# Patient Record
Sex: FEMALE | Race: WHITE | HISPANIC OR LATINO | Employment: FULL TIME | URBAN - METROPOLITAN AREA
[De-identification: names, ages, dates, MRNs, and addresses within clinical notes are randomized per-mention and may not be internally consistent; named-entity substitution may affect disease eponyms.]

---

## 2021-07-31 ENCOUNTER — OFFICE VISIT (OUTPATIENT)
Dept: URGENT CARE | Facility: PHYSICIAN GROUP | Age: 41
End: 2021-07-31
Payer: COMMERCIAL

## 2021-07-31 ENCOUNTER — HOSPITAL ENCOUNTER (OUTPATIENT)
Facility: MEDICAL CENTER | Age: 41
End: 2021-07-31
Attending: STUDENT IN AN ORGANIZED HEALTH CARE EDUCATION/TRAINING PROGRAM
Payer: COMMERCIAL

## 2021-07-31 VITALS
HEART RATE: 76 BPM | OXYGEN SATURATION: 95 % | DIASTOLIC BLOOD PRESSURE: 76 MMHG | BODY MASS INDEX: 35.44 KG/M2 | SYSTOLIC BLOOD PRESSURE: 122 MMHG | HEIGHT: 63 IN | RESPIRATION RATE: 16 BRPM | TEMPERATURE: 97.5 F | WEIGHT: 200 LBS

## 2021-07-31 DIAGNOSIS — N76.0 VAGINOSIS: ICD-10-CM

## 2021-07-31 DIAGNOSIS — R73.03 PREDIABETES: ICD-10-CM

## 2021-07-31 PROCEDURE — 99203 OFFICE O/P NEW LOW 30 MIN: CPT | Performed by: STUDENT IN AN ORGANIZED HEALTH CARE EDUCATION/TRAINING PROGRAM

## 2021-07-31 PROCEDURE — 87480 CANDIDA DNA DIR PROBE: CPT

## 2021-07-31 PROCEDURE — 87510 GARDNER VAG DNA DIR PROBE: CPT

## 2021-07-31 PROCEDURE — 87660 TRICHOMONAS VAGIN DIR PROBE: CPT

## 2021-07-31 RX ORDER — FLUCONAZOLE 150 MG/1
TABLET ORAL
Qty: 3 TABLET | Refills: 0 | Status: SHIPPED | OUTPATIENT
Start: 2021-07-31 | End: 2021-08-12

## 2021-07-31 NOTE — PROGRESS NOTES
"Subjective:   CHIEF COMPLAINT  Chief Complaint   Patient presents with   • Vaginitis     yeast infection, rash on vagina. burns when wiping, skin irritation. white spots all over vagina       HPI  Sherry Hatfield is a 41 y.o. female who presents with a chief complaint of concerns for possible yeast infection.  Says she gets these frequently.  She reports her symptoms as vaginal pruritus associated with white vaginal discharge along the periphery of the vulva.  There has been no odor.  She has tried topical over-the-counter antifungals which have not helped.  She recently moved to the area and when she was previously established with [facility] said she would typically be treated with 2 tablets of fluconazole on day 1 followed by a third tab on day 2-3.  He is not experiencing any dysuria or hematuria.  No odor.    REVIEW OF SYSTEMS  General: no fever or chills  GI: no nausea or vomiting  See HPI for further details.    PAST MEDICAL HISTORY  There are no problems to display for this patient.      SURGICAL HISTORY  patient denies any surgical history    ALLERGIES  Allergies   Allergen Reactions   • Morphine      itchy       CURRENT MEDICATIONS  Home Medications     Reviewed by Kelley Love on 07/31/21 at 1507  Med List Status: <None>   Medication Last Dose Status        Patient [place] Taking any Medications                       SOCIAL HISTORY  Social History     Tobacco Use   • Smoking status: Never Smoker   • Smokeless tobacco: Never Used   Vaping Use   • Vaping Use: Never used   Substance and Sexual Activity   • Alcohol use: Never   • Drug use: Never   • Sexual activity: Not on file       FAMILY HISTORY  No family history on file.       Objective:   PHYSICAL EXAM  VITAL SIGNS: /76 (BP Location: Right arm, Patient Position: Sitting, BP Cuff Size: Adult)   Pulse 76   Temp 36.4 °C (97.5 °F) (Temporal)   Resp 16   Ht 1.6 m (5' 3\")   Wt 90.7 kg (200 lb)   SpO2 95%   BMI 35.43 kg/m²     Gen: no " acute distress, normal voice  Skin: dry, intact, moist mucosal membranes  Head: Atraumatic, normocephalic  Psych: normal affect, normal judgement, alert, awake    Assessment/Plan:     1. Vaginosis  VAGINAL PATHOGENS DNA PANEL    fluconazole (DIFLUCAN) 150 MG tablet    AMB REFERRAL BACK TO [facility]   2. Prediabetes     1) signs and symptoms are consistent with vaginal candidiasis.  This is somewhat of a chronic intermittent issue for the patient and says her current symptoms are consistent with previous infections.   -Ordered Diflucan  -Ordered vaginal pathogens.  Only contact the patient if need to update/change medications at [phone]    2) ordered referral to establish with primary care as the patient recently moved to the area and was previously established with [facility].    Differential diagnosis, natural history, supportive care, and indications for immediate follow-up discussed. All questions answered. Patient agrees with the plan of care.    Follow-up as needed if symptoms worsen or fail to improve to PCP, Urgent care or Emergency Room.    Please note that this dictation was created using voice recognition software. I have made a reasonable attempt to correct obvious errors, but I expect that there are errors of grammar and possibly content that I did not discover before finalizing the note.

## 2021-08-01 DIAGNOSIS — N76.0 VAGINOSIS: ICD-10-CM

## 2021-08-01 LAB
CANDIDA DNA VAG QL PROBE+SIG AMP: NEGATIVE
G VAGINALIS DNA VAG QL PROBE+SIG AMP: NEGATIVE
T VAGINALIS DNA VAG QL PROBE+SIG AMP: NEGATIVE

## 2021-08-02 ENCOUNTER — TELEPHONE (OUTPATIENT)
Dept: SCHEDULING | Facility: IMAGING CENTER | Age: 41
End: 2021-08-02

## 2021-08-02 ENCOUNTER — TELEPHONE (OUTPATIENT)
Dept: URGENT CARE | Facility: CLINIC | Age: 41
End: 2021-08-02

## 2021-08-02 DIAGNOSIS — N76.0 VAGINOSIS: ICD-10-CM

## 2021-08-02 NOTE — TELEPHONE ENCOUNTER
I contacted the patient and spoke with her over the phone informing her of the negative vaginal pathogen test.  She says the itching has resolved but does have a rash along her inner thighs.  The rash was initially pruritic as well but that has resolved after taking Diflucan.  Its possible she has an underlying tinea cruris infection.  I instructed her to try a topical over-the-counter antifungal, but if symptoms do not improve or worsen she needs to come back in for reevaluation and visualization of the area.  The patient verbalized her understanding.  All questions were answered.

## 2021-08-07 ENCOUNTER — HOSPITAL ENCOUNTER (OUTPATIENT)
Facility: MEDICAL CENTER | Age: 41
End: 2021-08-07
Attending: PHYSICIAN ASSISTANT
Payer: COMMERCIAL

## 2021-08-07 ENCOUNTER — OFFICE VISIT (OUTPATIENT)
Dept: URGENT CARE | Facility: PHYSICIAN GROUP | Age: 41
End: 2021-08-07
Payer: COMMERCIAL

## 2021-08-07 VITALS
SYSTOLIC BLOOD PRESSURE: 126 MMHG | TEMPERATURE: 97.4 F | RESPIRATION RATE: 14 BRPM | HEART RATE: 74 BPM | BODY MASS INDEX: 35.22 KG/M2 | DIASTOLIC BLOOD PRESSURE: 74 MMHG | OXYGEN SATURATION: 97 % | WEIGHT: 198.8 LBS

## 2021-08-07 DIAGNOSIS — Z20.822 CLOSE EXPOSURE TO COVID-19 VIRUS: ICD-10-CM

## 2021-08-07 DIAGNOSIS — R05.9 COUGH: ICD-10-CM

## 2021-08-07 DIAGNOSIS — R11.0 NAUSEA: ICD-10-CM

## 2021-08-07 LAB — COVID ORDER STATUS COVID19: NORMAL

## 2021-08-07 PROCEDURE — U0005 INFEC AGEN DETEC AMPLI PROBE: HCPCS

## 2021-08-07 PROCEDURE — 99214 OFFICE O/P EST MOD 30 MIN: CPT | Mod: CS | Performed by: PHYSICIAN ASSISTANT

## 2021-08-07 PROCEDURE — U0003 INFECTIOUS AGENT DETECTION BY NUCLEIC ACID (DNA OR RNA); SEVERE ACUTE RESPIRATORY SYNDROME CORONAVIRUS 2 (SARS-COV-2) (CORONAVIRUS DISEASE [COVID-19]), AMPLIFIED PROBE TECHNIQUE, MAKING USE OF HIGH THROUGHPUT TECHNOLOGIES AS DESCRIBED BY CMS-2020-01-R: HCPCS

## 2021-08-07 RX ORDER — ONDANSETRON 4 MG/1
4 TABLET, FILM COATED ORAL EVERY 6 HOURS PRN
Qty: 20 TABLET | Refills: 1 | Status: SHIPPED | OUTPATIENT
Start: 2021-08-07 | End: 2021-10-04 | Stop reason: SDUPTHER

## 2021-08-07 RX ORDER — DEXTROMETHORPHAN HYDROBROMIDE AND PROMETHAZINE HYDROCHLORIDE 15; 6.25 MG/5ML; MG/5ML
5 SYRUP ORAL EVERY 4 HOURS PRN
Qty: 120 ML | Refills: 0 | Status: SHIPPED | OUTPATIENT
Start: 2021-08-07 | End: 2023-08-01

## 2021-08-07 ASSESSMENT — ENCOUNTER SYMPTOMS
SORE THROAT: 0
CHILLS: 1
SHORTNESS OF BREATH: 0
COUGH: 1
VOMITING: 0
ABDOMINAL PAIN: 0
WHEEZING: 0
DIARRHEA: 0
SPUTUM PRODUCTION: 0
NAUSEA: 1
FEVER: 0

## 2021-08-07 NOTE — PROGRESS NOTES
Subjective:   Sherry Hatfield  is a 41 y.o. female who presents for Body Aches (Head ache and fatigue satrted last night. (Exposed to Covid recently.))      HPI  Patient presents to urgent care out of concern for exposure to COVID-19.  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  Was in proximity of new hire employees last Wednesday until later that day patient had tested positive for COVID-19.  Patient complains of body aches, headache, cough.  Denies sore throat or ear pain.  Notes mild nausea without vomiting.  Denies abdominal pain diarrhea or rash.  Denies history of pneumonia.  Notes remote history of bronchitis in childhood.  Does have asthma, denies recent wheezing or worsening breathing.  Denies loss of taste or smell.  Patient has been vaccinated against COVID-19.  Did have Pfizer vaccine in May 2021.      Review of Systems   Constitutional: Positive for chills and malaise/fatigue. Negative for fever.   HENT: Negative for ear pain and sore throat.    Respiratory: Positive for cough. Negative for sputum production, shortness of breath and wheezing.    Gastrointestinal: Positive for nausea. Negative for abdominal pain, diarrhea and vomiting.   Skin: Negative for rash.       Allergies   Allergen Reactions   • Morphine      itchy        Objective:   /74   Pulse 74   Temp 36.3 °C (97.4 °F)   Resp 14   Wt 90.2 kg (198 lb 12.8 oz)   SpO2 97%   BMI 35.22 kg/m²     Physical Exam  Vitals and nursing note reviewed.   Constitutional:       General: She is not in acute distress.     Appearance: She is well-developed. She is not diaphoretic.   HENT:      Head: Normocephalic and atraumatic.      Right Ear: Tympanic membrane, ear canal and external ear normal.      Left Ear: Tympanic membrane, ear canal and external ear normal.      Nose: Nose normal.      Mouth/Throat:      Pharynx: Uvula midline. Posterior oropharyngeal erythema (slightly deeper) present. No oropharyngeal  exudate.      Tonsils: No tonsillar abscesses.   Eyes:      General: Lids are normal. No scleral icterus.        Right eye: No discharge.         Left eye: No discharge.      Conjunctiva/sclera: Conjunctivae normal.   Pulmonary:      Effort: Pulmonary effort is normal. No respiratory distress.      Breath sounds: No decreased breath sounds, wheezing, rhonchi or rales.   Musculoskeletal:         General: Normal range of motion.      Cervical back: Neck supple.   Lymphadenopathy:      Cervical: No cervical adenopathy.   Skin:     General: Skin is warm and dry.      Coloration: Skin is not pale.      Findings: No erythema.   Neurological:      Mental Status: She is alert and oriented to person, place, and time. She is not disoriented.   Psychiatric:         Speech: Speech normal.         Behavior: Behavior normal.     COVID pending    Assessment/Plan:   1. Close exposure to COVID-19 virus  - COVID/SARS CoV-2 PCR; Future    2. Nausea  - COVID/SARS CoV-2 PCR; Future  - ondansetron (ZOFRAN) 4 MG Tab tablet; Take 1 tablet by mouth every 6 hours as needed for Nausea/Vomiting.  Dispense: 20 tablet; Refill: 1    3. Cough  - COVID/SARS CoV-2 PCR; Future  - promethazine-dextromethorphan (PROMETHAZINE-DM) 6.25-15 MG/5ML syrup; Take 5 mL by mouth every four hours as needed for Cough.  Dispense: 120 mL; Refill: 0  Supportive care is reviewed with patient/caregiver - recommend to push PO fluids and electrolytes, over-the-counter symptom support medications reviewed, ER precautions with worsened symptoms, quarantine recommendations reviewed, sent with letterFritz for results of testing  Cautioned regarding potential for sedation with medication.  Return to clinic with lack of resolution or progression of symptoms.  ER precautions with any worsening symptoms are reviewed with patient/caregiver and they do express understanding    I have worn an N95 mask, gloves and eye protection for the entire encounter with this patient.      Differential diagnosis, natural history, supportive care, and indications for immediate follow-up discussed.

## 2021-08-07 NOTE — LETTER
August 7, 2021       Patient: Sherry Hatfield   YOB: 1980   Date of Visit: 8/7/2021           To Whom it May Concern,   Your employee/student was seen in our clinic today. A concern for COVID-19 has been identified and testing is in progress.?   ?  We are asking you to excuse absences while following self-isolation protocol per Center for Disease Control (CDC) guidelines. Your employee/student will be able to access test results through our electronic delivery system called [employer].?   ?  If the results of testing are negative, and once there has been no fever (temperature >100.4 F) for at least 72 hours without treatment, and no vomiting or diarrhea for at least 48 hours, then return to work/school is approved.   ?  If the results of testing are positive then your employee/student will be contacted by the [facility] or [facility] department for further instructions on duration of self-isolation and return to work/school protocol. In general, this will also follow the CDC guidelines with a minimum of 10 days from the onset of symptoms and without fever, vomiting, or diarrhea as above.?   ?  In general, repeat testing is not necessary and not offered through our [facility].?   ?  This is the only note that will be provided from [facility] for this visit. Your employee/student will require an appointment with a primary care provider if FMLA or disability forms are required.   ?  Sincerely,?           Ashok Boyle P.A.-C.  Electronically Signed

## 2021-08-09 LAB
SARS-COV-2 RNA RESP QL NAA+PROBE: DETECTED
SPECIMEN SOURCE: ABNORMAL

## 2021-08-11 ENCOUNTER — TELEPHONE (OUTPATIENT)
Dept: SCHEDULING | Facility: IMAGING CENTER | Age: 41
End: 2021-08-11

## 2021-08-12 ENCOUNTER — TELEMEDICINE (OUTPATIENT)
Dept: MEDICAL GROUP | Facility: PHYSICIAN GROUP | Age: 41
End: 2021-08-12
Payer: COMMERCIAL

## 2021-08-12 VITALS — WEIGHT: 198 LBS | BODY MASS INDEX: 35.08 KG/M2 | OXYGEN SATURATION: 97 % | HEIGHT: 63 IN

## 2021-08-12 DIAGNOSIS — G43.019 INTRACTABLE MIGRAINE WITHOUT AURA AND WITHOUT STATUS MIGRAINOSUS: ICD-10-CM

## 2021-08-12 DIAGNOSIS — U07.1 COVID-19 VIRUS INFECTION: ICD-10-CM

## 2021-08-12 DIAGNOSIS — J30.2 SEASONAL ALLERGIES: ICD-10-CM

## 2021-08-12 DIAGNOSIS — F31.9 BIPOLAR 1 DISORDER (HCC): ICD-10-CM

## 2021-08-12 DIAGNOSIS — J45.40 MODERATE PERSISTENT ASTHMA WITHOUT COMPLICATION: ICD-10-CM

## 2021-08-12 DIAGNOSIS — L98.9 SKIN DISORDER: ICD-10-CM

## 2021-08-12 DIAGNOSIS — K21.9 GASTROESOPHAGEAL REFLUX DISEASE, UNSPECIFIED WHETHER ESOPHAGITIS PRESENT: ICD-10-CM

## 2021-08-12 DIAGNOSIS — E28.2 PCOS (POLYCYSTIC OVARIAN SYNDROME): ICD-10-CM

## 2021-08-12 PROCEDURE — 99214 OFFICE O/P EST MOD 30 MIN: CPT | Performed by: FAMILY MEDICINE

## 2021-08-12 RX ORDER — ALBUTEROL SULFATE 90 UG/1
2 AEROSOL, METERED RESPIRATORY (INHALATION) EVERY 6 HOURS PRN
COMMUNITY

## 2021-08-12 RX ORDER — NORGESTIMATE AND ETHINYL ESTRADIOL 0.25-0.035
1 KIT ORAL DAILY
COMMUNITY
End: 2021-10-04 | Stop reason: SDUPTHER

## 2021-08-12 RX ORDER — FLUNISOLIDE 0.25 MG/ML
2 SOLUTION NASAL 2 TIMES DAILY
Qty: 1 EACH | Refills: 5 | Status: SHIPPED | OUTPATIENT
Start: 2021-08-12

## 2021-08-12 RX ORDER — FLUTICASONE PROPIONATE AND SALMETEROL XINAFOATE 230; 21 UG/1; UG/1
1 AEROSOL, METERED RESPIRATORY (INHALATION) 2 TIMES DAILY
COMMUNITY
End: 2021-08-12 | Stop reason: SDUPTHER

## 2021-08-12 RX ORDER — NARATRIPTAN 2.5 MG/1
2.5 TABLET ORAL
COMMUNITY
End: 2021-10-04 | Stop reason: SDUPTHER

## 2021-08-12 RX ORDER — DEXLANSOPRAZOLE 30 MG/1
60 CAPSULE, DELAYED RELEASE ORAL DAILY
COMMUNITY
End: 2021-08-12 | Stop reason: SDUPTHER

## 2021-08-12 RX ORDER — IBUPROFEN 800 MG/1
800 TABLET ORAL
COMMUNITY
End: 2021-10-04 | Stop reason: SDUPTHER

## 2021-08-12 RX ORDER — DEXLANSOPRAZOLE 30 MG/1
60 CAPSULE, DELAYED RELEASE ORAL DAILY
Qty: 60 CAPSULE | Refills: 5 | Status: SHIPPED | OUTPATIENT
Start: 2021-08-12 | End: 2021-10-01 | Stop reason: SDUPTHER

## 2021-08-12 RX ORDER — FLUTICASONE PROPIONATE AND SALMETEROL XINAFOATE 230; 21 UG/1; UG/1
1 AEROSOL, METERED RESPIRATORY (INHALATION) 2 TIMES DAILY
Qty: 12 G | Refills: 5 | Status: SHIPPED | OUTPATIENT
Start: 2021-08-12 | End: 2022-06-30

## 2021-08-12 RX ORDER — TRIAMCINOLONE ACETONIDE 1 MG/G
CREAM TOPICAL 2 TIMES DAILY
COMMUNITY

## 2021-08-12 RX ORDER — TIOTROPIUM BROMIDE INHALATION SPRAY 3.12 UG/1
5 SPRAY, METERED RESPIRATORY (INHALATION) DAILY
COMMUNITY
End: 2022-06-30

## 2021-08-12 RX ORDER — QUETIAPINE FUMARATE 25 MG/1
100 TABLET, FILM COATED ORAL
COMMUNITY
End: 2021-10-04 | Stop reason: SDUPTHER

## 2021-08-12 RX ORDER — TIOTROPIUM BROMIDE 18 UG/1
18 CAPSULE ORAL; RESPIRATORY (INHALATION) DAILY
COMMUNITY
End: 2021-08-12 | Stop reason: SDUPTHER

## 2021-08-12 RX ORDER — FLUNISOLIDE 0.25 MG/ML
2 SOLUTION NASAL 2 TIMES DAILY
COMMUNITY
End: 2021-08-12 | Stop reason: SDUPTHER

## 2021-08-12 RX ORDER — TIZANIDINE HYDROCHLORIDE 2 MG/1
2 CAPSULE, GELATIN COATED ORAL 3 TIMES DAILY PRN
COMMUNITY
End: 2023-08-01 | Stop reason: SDUPTHER

## 2021-08-12 RX ORDER — TRETINOIN 0.5 MG/G
CREAM TOPICAL NIGHTLY
COMMUNITY
End: 2021-10-04 | Stop reason: SDUPTHER

## 2021-08-12 RX ORDER — OXCARBAZEPINE 150 MG/1
600 TABLET, FILM COATED ORAL
COMMUNITY
End: 2021-10-04 | Stop reason: SDUPTHER

## 2021-08-12 RX ORDER — TIOTROPIUM BROMIDE 18 UG/1
18 CAPSULE ORAL; RESPIRATORY (INHALATION) DAILY
Qty: 30 CAPSULE | Refills: 5 | Status: SHIPPED | OUTPATIENT
Start: 2021-08-12 | End: 2022-06-30

## 2021-08-12 RX ORDER — SPIRONOLACTONE 25 MG/1
25 TABLET ORAL 2 TIMES DAILY
COMMUNITY
End: 2021-10-04 | Stop reason: SDUPTHER

## 2021-08-12 RX ORDER — PROPRANOLOL HYDROCHLORIDE 20 MG/1
40 TABLET ORAL 2 TIMES DAILY
COMMUNITY
End: 2021-10-04 | Stop reason: SDUPTHER

## 2021-08-12 ASSESSMENT — PATIENT HEALTH QUESTIONNAIRE - PHQ9: CLINICAL INTERPRETATION OF PHQ2 SCORE: 0

## 2021-08-12 NOTE — LETTER
[facility]  Kelly Roth M.D.  [address]  [place] [address]  Fax: [phone]   Authorization for Release/Disclosure of   Protected Health Information   Name: JEANETH HATFIELD : 1980 SSN: [id number]   Address: [address] [place] [address]  [place] [address] Phone:    [phone] (home)    I authorize the entity listed below to release/disclose the PHI below to:   [facility]/Kelly Roth M.D. and Kelly Roth M.D.   Provider or Entity Name:  [facility]   City, [facility] Phone:      Fax:     Reason for request: continuity of care   Information to be released:    [  ] LAST COLONOSCOPY,  including any PATH REPORT and follow-up  [  ] LAST FIT/COLOGUARD RESULT [  ] LAST DEXA  [  ] LAST MAMMOGRAM  [  ] LAST PAP  [  ] LAST LABS [  ] RETINA EXAM REPORT  [  ] IMMUNIZATION RECORDS  [  ] Release all info      [  ] Check here and initial the line next to each item to release ALL health information INCLUDING  _____ Care and treatment for drug and / or alcohol abuse  _____ HIV testing, infection status, or AIDS  _____ Genetic Testing    DATES OF SERVICE OR TIME PERIOD TO BE DISCLOSED: _____________  I understand and acknowledge that:  * This Authorization may be revoked at any time by you in writing, except if your health information has already been used or disclosed.  * Your health information that will be used or disclosed as a result of you signing this authorization could be re-disclosed by the recipient. If this occurs, your re-disclosed health information may no longer be protected by State or Federal laws.  * You may refuse to sign this Authorization. Your refusal will not affect your ability to obtain treatment.  * This Authorization becomes effective upon signing and will  on (date) __________.      If no date is indicated, this Authorization will  one (1) year from the signature date.    Name: Jeaneth Hatfield    Signature:   Date:     2021       PLEASE  FAX REQUESTED RECORDS BACK TO: [phone]

## 2021-08-15 NOTE — PROGRESS NOTES
Virtual Visit: New Patient   This visit was conducted via Zoom using secure and encrypted videoconferencing technology.   The patient was in a private location in the state [facility].    The patient's identity was confirmed and verbal consent was obtained for this virtual visit.    Subjective:     CC:   Chief Complaint   Patient presents with   • New Patient     est care       Sherry Hatfield is a 41 y.o. female presenting to establish care and to discuss the evaluation and management of:      This is a 41-year-old  female who is here as a new patient to get established with me.  [place].  She was previously under the care of [facility].    She came down with symptoms of COVID-19 infection a week ago.  She was exposed to a coworker who tested positive for COVID-19 infection.  Her symptoms are body aches, headache, cough, sore throat, hoarseness of voice, mild nausea without vomiting.  Denies any shortness of breath.  She went to urgent care 5 days ago and was tested positive for COVID-19 infection.  Patient with history of asthma but denies any wheezing or shortness of breath.  She already completed COVID-19 vaccination in May 2021.    She has multiple medical problems.    She suffers from PCOS and was under the care of a gynecologist through [facility].  She is on spironolactone and OCP.  She would like to be referred to a local gynecologist.    She also has migraine headache without aura.  She is on Inderal for migraine prophylaxis.  She takes Amerge as needed for breakthrough headache.  Headache episodes are still happening frequently about twice a week.  She takes Zofran as needed for nausea associated with migraine headache.  She wants to be referred to the neurologist for further evaluation and treatment to get her migraine headaches better control.  She was previously under the care of a neurologist through [facility].    She also has asthma for which she is on Advair and Spiriva.  She has rescue inhaler which is albuterol HFA.  She said she only uses the albuterol HFA as needed during exacerbations triggered by mostly by environmental allergens.  Otherwise her asthma is well controlled.  She asked to be referred to an allergist for follow-up of her asthma and allergies.     She has bipolar 1 disorder and she takes Seroquel, Trileptal.  She would also like to be referred to a local psychiatrist to follow-up on her problem.  She is currently under control on these medications.    She suffers from GERD and she said the only medication that works well and controls her GERD symptoms is Dexilant which she is currently taking.  She takes 30 mg 2 capsules daily.    She suffers from seasonal allergies for which she takes flunisolide nasal spray with good results.    She said she has a skin condition which affects mainly her scalp.  She is on clobetasol foam to control the problem.  She was under the care of a dermatologist in [facility].  She would like to be referred to the dermatologist.        ROS  See HPI    Current medicines (including changes today)  Current Outpatient Medications   Medication Sig Dispense Refill   • propranolol (INDERAL) 20 MG Tab Take 40 mg by mouth 2 times a day.     • albuterol 108 (90 Base) MCG/ACT Aero Soln inhalation aerosol Inhale 2 Puffs every 6 hours as needed for Shortness of Breath.     • QUEtiapine (SEROQUEL) 25 MG Tab Take 100 mg by mouth at bedtime.     • tretinoin (RETIN-A) 0.05 % cream Apply  topically every evening.     • triamcinolone acetonide (KENALOG) 0.1 % Cream Apply  topically 2 times a day.     • norgestimate-ethinyl estradiol (SPRINTEC 28) 0.25-35 MG-MCG per tablet Take 1 Tablet by mouth every day.     • Clobetasol Emul Foam w/MoistCr 0.05 % Kit Apply  topically.     • naratriptan (AMERGE) 2.5 MG tablet Take 2.5 mg by mouth one time as needed for Migraine.     •  "tizanidine (ZANAFLEX) 2 MG capsule Take 2 mg by mouth 3 times a day as needed.     • ibuprofen (MOTRIN) 800 MG Tab Take 800 mg by mouth at bedtime.     • tiotropium (SPIRIVA RESPIMAT) 2.5 mcg/Act Aero Soln Inhale 5 mcg every day.     • OXcarbazepine (TRILEPTAL) 150 MG Tab Take 600 mg by mouth at bedtime.     • spironolactone (ALDACTONE) 25 MG Tab Take 25 mg by mouth 2 times a day.     • dexlansoprazole (DEXILANT) 30 MG CAPSULE DELAYED RELEASE Take 2 Capsules by mouth every day. 60 Capsule 5   • flunisolide (NASALIDE) 25 MCG/ACT (0.025%) Solution Administer 2 Sprays into affected nostril(S) 2 times a day. 1 Each 5   • fluticasone-salmeterol (ADVAIR HFA) 230-21 MCG/ACT inhaler Inhale 1 Puff 2 times a day. 12 g 5   • tiotropium (SPIRIVA) 18 MCG Cap Place 1 Capsule into inhaler and inhale every day. 30 Capsule 5   • ondansetron (ZOFRAN) 4 MG Tab tablet Take 1 tablet by mouth every 6 hours as needed for Nausea/Vomiting. 20 tablet 1   • promethazine-dextromethorphan (PROMETHAZINE-DM) 6.25-15 MG/5ML syrup Take 5 mL by mouth every four hours as needed for Cough. 120 mL 0     No current facility-administered medications for this visit.       Patient Active Problem List    Diagnosis Date Noted   • PCOS (polycystic ovarian syndrome)    • Migraine headache without aura    • Asthma    • Bipolar 1 disorder (HCC)    • GERD (gastroesophageal reflux disease)         Objective:   Ht 1.6 m (5' 3\")   Wt 89.8 kg (198 lb)   SpO2 97%   BMI 35.07 kg/m²     Physical Exam:  Constitutional: Alert, no distress, well-groomed, positive hoarse voice   skin: No rashes in visible areas.  Eye: Round. Conjunctiva clear, lids normal. No icterus.   ENMT: Lips pink without lesions, good dentition, moist mucous membranes.  Positive hoarse voice  Neck: No masses, no thyromegaly. Moves freely without pain.  Respiratory: Unlabored respiratory effort, no cough or audible wheeze  Psych: Alert and oriented x3, normal affect and mood.   Assessment and Plan: "   The following treatment plan was discussed:       1. COVID-19 virus infection  Symptoms are mild.  Advised to manage this conservatively at home with proper hydration, rest, Tylenol or NSAIDs as needed for fever and body aches.  ER precautions given.    2. PCOS (polycystic ovarian syndrome)  Continue spironolactone told and OCP.  We will refer to local GYN.  Will get records from her previous GYN and primary care physician.  She will provide me with names of her doctors.    3. Intractable migraine without aura and without status migrainosus  We will refer to our neurology group for further evaluation and treatment and to get the migraine better controlled.  In the meantime continue current medications.    4. Moderate persistent asthma without complication  Stable and controlled on her regimen of Spiriva, Advair and albuterol inhaler as a rescue inhaler.    5. Bipolar 1 disorder (HCC)  Currently stable and controlled on her medications.  Referral placed to psychiatrist for ongoing follow-up.    6. Gastroesophageal reflux disease, unspecified whether esophagitis present  Stable and controlled on her current medication Dexilant.  She said this is the only medication that is working to control her symptoms.    7. Seasonal allergies  She takes flunisolide nasal spray to manage her problem with good results.    8. Skin disorder  She is asking for referral to the dermatologist for her skin disorder involving the scalp.  She is on clobetasol foam.  Referral placed.    Follow-up: Return in about 2 months (around 10/12/2021).     We will get records from previous physicians.  She will provide me with the names of them.

## 2021-10-04 ENCOUNTER — DOCUMENTATION (OUTPATIENT)
Dept: MEDICAL GROUP | Facility: PHYSICIAN GROUP | Age: 41
End: 2021-10-04

## 2021-10-04 DIAGNOSIS — R11.0 NAUSEA: ICD-10-CM

## 2021-10-04 DIAGNOSIS — Z30.41 ENCOUNTER FOR SURVEILLANCE OF CONTRACEPTIVE PILLS: ICD-10-CM

## 2021-10-04 DIAGNOSIS — L98.9 SKIN DISORDER: ICD-10-CM

## 2021-10-04 DIAGNOSIS — G43.019 INTRACTABLE MIGRAINE WITHOUT AURA AND WITHOUT STATUS MIGRAINOSUS: ICD-10-CM

## 2021-10-04 DIAGNOSIS — E28.2 PCOS (POLYCYSTIC OVARIAN SYNDROME): ICD-10-CM

## 2021-10-04 DIAGNOSIS — F31.9 BIPOLAR 1 DISORDER (HCC): ICD-10-CM

## 2021-10-04 RX ORDER — PROPRANOLOL HYDROCHLORIDE 20 MG/1
40 TABLET ORAL 2 TIMES DAILY
Qty: 120 TABLET | Refills: 5 | Status: SHIPPED | OUTPATIENT
Start: 2021-10-04 | End: 2022-06-14 | Stop reason: SDUPTHER

## 2021-10-04 RX ORDER — OXCARBAZEPINE 150 MG/1
600 TABLET, FILM COATED ORAL
Qty: 120 TABLET | Refills: 0 | Status: SHIPPED | OUTPATIENT
Start: 2021-10-04 | End: 2021-11-29

## 2021-10-04 RX ORDER — NARATRIPTAN 2.5 MG/1
2.5 TABLET ORAL
Qty: 10 TABLET | Refills: 5 | Status: SHIPPED | OUTPATIENT
Start: 2021-10-04 | End: 2022-07-12 | Stop reason: SDUPTHER

## 2021-10-04 RX ORDER — TRETINOIN 0.5 MG/G
CREAM TOPICAL
Qty: 45 G | Refills: 5 | Status: SHIPPED | OUTPATIENT
Start: 2021-10-04 | End: 2023-03-30 | Stop reason: SDUPTHER

## 2021-10-04 RX ORDER — NORGESTIMATE AND ETHINYL ESTRADIOL 0.25-0.035
1 KIT ORAL DAILY
Qty: 28 TABLET | Refills: 5 | Status: SHIPPED | OUTPATIENT
Start: 2021-10-04 | End: 2021-11-18

## 2021-10-04 RX ORDER — QUETIAPINE FUMARATE 25 MG/1
100 TABLET, FILM COATED ORAL
Qty: 120 TABLET | Refills: 0 | Status: SHIPPED | OUTPATIENT
Start: 2021-10-04 | End: 2021-11-29

## 2021-10-04 RX ORDER — IBUPROFEN 800 MG/1
800 TABLET ORAL EVERY 8 HOURS PRN
Qty: 90 TABLET | Refills: 1 | Status: SHIPPED | OUTPATIENT
Start: 2021-10-04 | End: 2023-08-01

## 2021-10-04 RX ORDER — SPIRONOLACTONE 25 MG/1
25 TABLET ORAL 2 TIMES DAILY
Qty: 60 TABLET | Refills: 5 | Status: SHIPPED | OUTPATIENT
Start: 2021-10-04 | End: 2022-06-14 | Stop reason: SDUPTHER

## 2021-10-04 RX ORDER — CLOBETASOL PROPIONATE 0.5 MG/G
AEROSOL, FOAM TOPICAL
Qty: 100 G | Refills: 5 | Status: SHIPPED | OUTPATIENT
Start: 2021-10-04 | End: 2022-05-20 | Stop reason: SDUPTHER

## 2021-10-04 RX ORDER — ONDANSETRON 4 MG/1
4 TABLET, FILM COATED ORAL EVERY 6 HOURS PRN
Qty: 20 TABLET | Refills: 1 | Status: SHIPPED | OUTPATIENT
Start: 2021-10-04 | End: 2021-10-11

## 2021-10-04 NOTE — PROGRESS NOTES
MEDICATION PRIOR AUTHORIZATION NEEDED:    1. Name of Medication: Clobetasol Foam, & Dexilant 30mg    2. Requested By (Name of Pharmacy): [facility]      3. Is insurance on file current? Yes    4. What is the name & phone number of the 3rd party payor? [employer] [phone]

## 2021-11-01 ENCOUNTER — OFFICE VISIT (OUTPATIENT)
Dept: URGENT CARE | Facility: PHYSICIAN GROUP | Age: 41
End: 2021-11-01
Payer: COMMERCIAL

## 2021-11-01 VITALS
OXYGEN SATURATION: 97 % | SYSTOLIC BLOOD PRESSURE: 118 MMHG | TEMPERATURE: 98.2 F | HEART RATE: 84 BPM | HEIGHT: 63 IN | RESPIRATION RATE: 16 BRPM | BODY MASS INDEX: 36.14 KG/M2 | WEIGHT: 204 LBS | DIASTOLIC BLOOD PRESSURE: 80 MMHG

## 2021-11-01 DIAGNOSIS — M25.572 ACUTE LEFT ANKLE PAIN: ICD-10-CM

## 2021-11-01 DIAGNOSIS — S96.912A STRAIN OF LEFT ANKLE, INITIAL ENCOUNTER: ICD-10-CM

## 2021-11-01 PROCEDURE — 99213 OFFICE O/P EST LOW 20 MIN: CPT | Performed by: PHYSICIAN ASSISTANT

## 2021-11-01 RX ORDER — KETOROLAC TROMETHAMINE 30 MG/ML
30 INJECTION, SOLUTION INTRAMUSCULAR; INTRAVENOUS ONCE
Status: COMPLETED | OUTPATIENT
Start: 2021-11-01 | End: 2021-11-01

## 2021-11-01 RX ADMIN — KETOROLAC TROMETHAMINE 30 MG: 30 INJECTION, SOLUTION INTRAMUSCULAR; INTRAVENOUS at 19:45

## 2021-11-02 ASSESSMENT — ENCOUNTER SYMPTOMS
NAUSEA: 0
CHILLS: 0
FEVER: 0
TINGLING: 0
SENSORY CHANGE: 0
PALPITATIONS: 0
SHORTNESS OF BREATH: 0
BLURRED VISION: 0
SORE THROAT: 0
VOMITING: 0

## 2021-11-02 NOTE — PROGRESS NOTES
Subjective     Sherry Hatfield is a 41 y.o. female who presents with Ankle Pain (L ankle pain, painful to put pressure/walk, x1 day )    HPI:  Sherry Hatfield is a 41 y.o. female who presents today for evaluation of left ankle pain.  Patient reports that on Friday she was walking in her house when she started to feel a pain in the medial aspect of her left ankle.  States that she did not twist it or fall.  Denies injuring it in any capacity.  Notes that she has been trying to rest it, apply ice, keep it elevated and she is also been applying some topical Salonpas.  She is also using OTC analgesics and anti-inflammatories.  She notes that despite all of this, however, the pain has been quite persistent and it is worse with ambulation and palpation.  She is able to walk on the outside of her foot.        Review of Systems   Constitutional: Negative for chills and fever.   HENT: Negative for sore throat.    Eyes: Negative for blurred vision.   Respiratory: Negative for shortness of breath.    Cardiovascular: Negative for chest pain and palpitations.   Gastrointestinal: Negative for nausea and vomiting.   Musculoskeletal: Positive for joint pain (left ankle pain).   Neurological: Negative for tingling and sensory change.         PMH:  has a past medical history of Asthma, Bipolar 1 disorder (HCC), GERD (gastroesophageal reflux disease), Migraine headache without aura, and PCOS (polycystic ovarian syndrome).  MEDS:   Current Outpatient Medications:   •  naratriptan (AMERGE) 2.5 MG tablet, Take 1 Tablet by mouth one time as needed for Migraine., Disp: 10 Tablet, Rfl: 5  •  propranolol (INDERAL) 20 MG Tab, Take 2 Tablets by mouth 2 times a day., Disp: 120 Tablet, Rfl: 5  •  QUEtiapine (SEROQUEL) 25 MG Tab, Take 4 Tablets by mouth at bedtime., Disp: 120 Tablet, Rfl: 0  •  tretinoin (RETIN-A) 0.05 % cream, Apply  topically every evening., Disp: 45 g, Rfl: 5  •  norgestimate-ethinyl estradiol (SPRINTEC 28) 0.25-35 MG-MCG per  tablet, Take 1 Tablet by mouth every day., Disp: 28 Tablet, Rfl: 5  •  ibuprofen (MOTRIN) 800 MG Tab, Take 1 Tablet by mouth every 8 hours as needed., Disp: 90 Tablet, Rfl: 1  •  OXcarbazepine (TRILEPTAL) 150 MG Tab, Take 4 Tablets by mouth at bedtime., Disp: 120 Tablet, Rfl: 0  •  spironolactone (ALDACTONE) 25 MG Tab, Take 1 Tablet by mouth 2 times a day., Disp: 60 Tablet, Rfl: 5  •  clobetasol (OLUX) 0.05 % foam, Apply to affected areas on the scalp twice daily, Disp: 100 g, Rfl: 5  •  dexlansoprazole (DEXILANT) 30 MG CAPSULE DELAYED RELEASE, Take 2 Capsules by mouth every day., Disp: 60 Capsule, Rfl: 5  •  albuterol 108 (90 Base) MCG/ACT Aero Soln inhalation aerosol, Inhale 2 Puffs every 6 hours as needed for Shortness of Breath., Disp: , Rfl:   •  triamcinolone acetonide (KENALOG) 0.1 % Cream, Apply  topically 2 times a day., Disp: , Rfl:   •  Clobetasol Emul Foam w/MoistCr 0.05 % Kit, Apply  topically., Disp: , Rfl:   •  tizanidine (ZANAFLEX) 2 MG capsule, Take 2 mg by mouth 3 times a day as needed., Disp: , Rfl:   •  tiotropium (SPIRIVA RESPIMAT) 2.5 mcg/Act Aero Soln, Inhale 5 mcg every day., Disp: , Rfl:   •  flunisolide (NASALIDE) 25 MCG/ACT (0.025%) Solution, Administer 2 Sprays into affected nostril(S) 2 times a day., Disp: 1 Each, Rfl: 5  •  fluticasone-salmeterol (ADVAIR HFA) 230-21 MCG/ACT inhaler, Inhale 1 Puff 2 times a day., Disp: 12 g, Rfl: 5  •  tiotropium (SPIRIVA) 18 MCG Cap, Place 1 Capsule into inhaler and inhale every day., Disp: 30 Capsule, Rfl: 5  •  promethazine-dextromethorphan (PROMETHAZINE-DM) 6.25-15 MG/5ML syrup, Take 5 mL by mouth every four hours as needed for Cough., Disp: 120 mL, Rfl: 0  ALLERGIES:   Allergies   Allergen Reactions   • Morphine      itchy     SURGHX:   Past Surgical History:   Procedure Laterality Date   • PRIMARY C SECTION      x2   • TUBAL COAGULATION LAPAROSCOPIC BILATERAL       SOCHX:  reports that she has never smoked. She has never used smokeless tobacco. She  "reports that she does not drink alcohol and does not use drugs.  FH: Family history was reviewed, no pertinent findings to report      Objective     /80 (BP Location: Left arm, Patient Position: Sitting, BP Cuff Size: Large adult)   Pulse 84   Temp 36.8 °C (98.2 °F) (Temporal)   Resp 16   Ht 1.6 m (5' 3\")   Wt 92.5 kg (204 lb)   SpO2 97%   BMI 36.14 kg/m²      Physical Exam  Constitutional:       Appearance: She is well-developed.   HENT:      Head: Normocephalic and atraumatic.      Right Ear: External ear normal.      Left Ear: External ear normal.   Eyes:      Conjunctiva/sclera: Conjunctivae normal.      Pupils: Pupils are equal, round, and reactive to light.   Cardiovascular:      Rate and Rhythm: Normal rate and regular rhythm.      Heart sounds: Normal heart sounds. No murmur heard.     Pulmonary:      Effort: Pulmonary effort is normal.      Breath sounds: Normal breath sounds. No wheezing.   Musculoskeletal:      Left ankle: No swelling, deformity or ecchymosis. Tenderness (Medial aspect of left ankle is diffusely tender to palpation but no real tenderness noted over the bony aspect.  No swelling or obvious warm any noted.) present. No lateral malleolus, base of 5th metatarsal or proximal fibula tenderness. Decreased range of motion.      Left Achilles Tendon: Normal.      Comments: Patient has 5/5 strength with resisted motions of left lower extremity but does note pain with this.  Also notes pain with resisted motions of the left great toe.  Distal neurovascular intact.  Cap refill less than 2 seconds.   Skin:     General: Skin is warm and dry.      Capillary Refill: Capillary refill takes less than 2 seconds.   Neurological:      Mental Status: She is alert and oriented to person, place, and time.   Psychiatric:         Behavior: Behavior normal.         Judgment: Judgment normal.         Assessment & Plan     1. Acute left ankle pain  - ketorolac (TORADOL) injection 30 mg  - Referral to " Sports Medicine    2. Strain of left ankle, initial encounter  - ketorolac (TORADOL) injection 30 mg  - Referral to Sports Medicine    There is no specific injury and patient has no  bony tenderness, swelling, or ecchymosis.  Did not feel that there is any indication for x-ray today.  Patient was placed in a tall walking boot and provided crutches.  She was advised to be weightbearing as tolerated and urgent referral to sports medicine placed for more definitive management and evaluation of her symptoms.  Likely ligamentous or tendon injury.  She was given 30 mg Toradol in the office and advised not to use any other OTC NSAIDs until tomorrow night.  She should continue to apply ice to the area multiple times per day.  May also start to alternate heat as well.  Keep elevated as much as possible.                Differential Diagnosis, natural history, and supportive care discussed. Return to the Urgent Care or follow up with your PCP if symptoms fail to resolve, or for any new or worsening symptoms. Emergency room precautions discussed. Patient and/or family appears understanding of information.

## 2021-11-18 ENCOUNTER — TELEMEDICINE (OUTPATIENT)
Dept: OBGYN | Facility: CLINIC | Age: 41
End: 2021-11-18
Payer: COMMERCIAL

## 2021-11-18 DIAGNOSIS — E28.2 PCOS (POLYCYSTIC OVARIAN SYNDROME): ICD-10-CM

## 2021-11-18 DIAGNOSIS — N93.9 ABNORMAL UTERINE BLEEDING (AUB): ICD-10-CM

## 2021-11-18 PROCEDURE — 99204 OFFICE O/P NEW MOD 45 MIN: CPT | Mod: 95 | Performed by: OBSTETRICS & GYNECOLOGY

## 2021-11-18 NOTE — PROGRESS NOTES
No chief complaint on file.  cc: PCOS    This evaluation was conducted via Zoom using secure and encrypted videoconferencing technology. The patient was in a private location in the state of [place].    The patient's identity was confirmed and verbal consent was obtained for this virtual visit.    History of present illness: 41 y.o. No obstetric history on file. No LMP recorded. presents with mgmt of PCOS. Has been on menses since September and it hasn't stopped.  Has gained weight.  Clots are coming out as well.  Hx of BTL in past. Taking iron pills as well.      Also has vulvodynia, has done pelvic floor PT in past, helped some.  Has weakened vaginal walls as well, does not feel prolapse.  Fells vaginal fullness, heaviness.      Review of systems:  Pertinent positives documented in HPI and all other systems reviewed & are negative    Past OB History:   OB History   No obstetric history on file.   33+1- living  33+4- living  28- living   32- living   29+6- living     Past Gynecological History  No LMP recorded.  BC or HRT: sprintec in past  Menarche: 11  Menses: heavy irregular  Sexually active: yes, dyspareunia with entry  Sexually transmitted infections: chlamydia x2, trichomonas, HPV  Pap: last 2019, HPV hx of abnormal with colposcopy of cervix  Symptoms of overactive bladder: yes  Symptoms of stress incontinence: yes, is bad  Mammogram: last 2020  Fibroids?: yes  Ovarian cysts?: yes pcos    All PMH, PSH, allergies, social history and FH reviewed and updated today:  Past Medical History:   Diagnosis Date   • Asthma    • Bipolar 1 disorder (HCC)    • GERD (gastroesophageal reflux disease)    • Migraine headache without aura    • PCOS (polycystic ovarian syndrome)        Past Surgical History:   Procedure Laterality Date   • PRIMARY C SECTION      x2   • TUBAL COAGULATION LAPAROSCOPIC BILATERAL         Allergies:   Allergies   Allergen Reactions   • Morphine      itchy       Social History     Socioeconomic History    • Marital status:      Spouse name: Not on file   • Number of children: 5   • Years of education: Not on file   • Highest education level: Not on file   Occupational History   • 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   Tobacco Use   • Smoking status: Never Smoker   • Smokeless tobacco: Never Used   Vaping Use   • Vaping Use: Never used   Substance and Sexual Activity   • Alcohol use: Never   • Drug use: Never   • Sexual activity: Yes   Other Topics Concern   • Not on file   Social History Narrative   • Not on file     Social Determinants of Health     Financial Resource Strain:    • Difficulty of Paying Living Expenses: Not on file   Food Insecurity:    • Worried About Running Out of Food in the Last Year: Not on file   • Ran Out of Food in the Last Year: Not on file   Transportation Needs:    • Lack of Transportation (Medical): Not on file   • Lack of Transportation (Non-Medical): Not on file   Physical Activity:    • Days of Exercise per Week: Not on file   • Minutes of Exercise per Session: Not on file   Stress:    • Feeling of Stress : Not on file   Social Connections:    • Frequency of Communication with Friends and Family: Not on file   • Frequency of Social Gatherings with Friends and Family: Not on file   • Attends [employer] Services: Not on file   • Active Member of Clubs or Organizations: Not on file   • Attends Club or Organization Meetings: Not on file   • Marital Status: Not on file   Intimate Partner Violence:    • Fear of Current or Ex-Partner: Not on file   • Emotionally Abused: Not on file   • Physically Abused: Not on file   • Sexually Abused: Not on file   Housing Stability:    • Unable to Pay for Housing in the Last Year: Not on file   • Number of Places Lived in the Last Year: Not on file   • Unstable Housing in the Last Year: Not on file       Family History   Problem Relation Age of Onset   • GI Disease Mother         liver cirrhosis s/p transplant due to ETOH   • Diabetes  Mother    • Cancer Mother         basal cell cancer   • Alcohol abuse Mother    • Heart Disease Father         CHF   • GI Disease Father         early liver cirrhosis   • Alcohol abuse Father    • Diabetes Father    • Kidney Disease Father         ESRD on dialysis   • Obesity Sister    • Diabetes Sister    • Alcohol abuse Sister    • No Known Problems Brother        Physical exam:  There were no vitals taken for this visit.    General: appears stated age, is in no apparent distress, is well developed and well nourished  Skin: No rashes, or ulcers or lesions seen  Psychiatric: Patient shows appropriate affect, is alert and oriented x3, intact judgment and insight.      Assessment  41 y.o. No obstetric history on file. here for:   1. Abnormal uterine bleeding (AUB)  TESTOSTERONE F&T FEMALES/CHILD    DHEA SULFATE    17-OH PROGESTERONE    VITAMIN D, 1,25 DIHYDROXY    TSH    PROLACTIN    CBC WITHOUT DIFFERENTIAL    US-PELVIC COMPLETE (TRANSABDOMINAL/TRANSVAGINAL) (COMBO)   2. PCOS (polycystic ovarian syndrome)  TESTOSTERONE F&T FEMALES/CHILD    DHEA SULFATE    17-OH PROGESTERONE    VITAMIN D, 1,25 DIHYDROXY    TSH    PROLACTIN    CBC WITHOUT DIFFERENTIAL    US-PELVIC COMPLETE (TRANSABDOMINAL/TRANSVAGINAL) (COMBO)       Plan  - Will check labs  - Check pelvic US  - Take 2 pills a day for 3 days then start drospirenone  - Follow up 2 months for physical exam

## 2021-11-20 ENCOUNTER — HOSPITAL ENCOUNTER (OUTPATIENT)
Dept: LAB | Facility: MEDICAL CENTER | Age: 41
End: 2021-11-20
Attending: OBSTETRICS & GYNECOLOGY
Payer: COMMERCIAL

## 2021-11-20 DIAGNOSIS — N93.9 ABNORMAL UTERINE BLEEDING (AUB): ICD-10-CM

## 2021-11-20 DIAGNOSIS — E28.2 PCOS (POLYCYSTIC OVARIAN SYNDROME): ICD-10-CM

## 2021-11-20 LAB
DHEA-S SERPL-MCNC: 110 UG/DL (ref 60.9–337)
ERYTHROCYTE [DISTWIDTH] IN BLOOD BY AUTOMATED COUNT: 42.8 FL (ref 35.9–50)
HCT VFR BLD AUTO: 37 % (ref 37–47)
HGB BLD-MCNC: 12.6 G/DL (ref 12–16)
MCH RBC QN AUTO: 31.2 PG (ref 27–33)
MCHC RBC AUTO-ENTMCNC: 34.1 G/DL (ref 33.6–35)
MCV RBC AUTO: 91.6 FL (ref 81.4–97.8)
PLATELET # BLD AUTO: 398 K/UL (ref 164–446)
PMV BLD AUTO: 10.8 FL (ref 9–12.9)
PROLACTIN SERPL-MCNC: 27.3 NG/ML (ref 2.8–26)
RBC # BLD AUTO: 4.04 M/UL (ref 4.2–5.4)
TSH SERPL DL<=0.005 MIU/L-ACNC: 1.84 UIU/ML (ref 0.38–5.33)
WBC # BLD AUTO: 10.9 K/UL (ref 4.8–10.8)

## 2021-11-20 PROCEDURE — 36415 COLL VENOUS BLD VENIPUNCTURE: CPT

## 2021-11-20 PROCEDURE — 84403 ASSAY OF TOTAL TESTOSTERONE: CPT

## 2021-11-20 PROCEDURE — 84402 ASSAY OF FREE TESTOSTERONE: CPT

## 2021-11-20 PROCEDURE — 84146 ASSAY OF PROLACTIN: CPT

## 2021-11-20 PROCEDURE — 83498 ASY HYDROXYPROGESTERONE 17-D: CPT

## 2021-11-20 PROCEDURE — 84443 ASSAY THYROID STIM HORMONE: CPT

## 2021-11-20 PROCEDURE — 82627 DEHYDROEPIANDROSTERONE: CPT

## 2021-11-20 PROCEDURE — 85027 COMPLETE CBC AUTOMATED: CPT

## 2021-11-20 PROCEDURE — 84270 ASSAY OF SEX HORMONE GLOBUL: CPT

## 2021-11-20 PROCEDURE — 82652 VIT D 1 25-DIHYDROXY: CPT

## 2021-11-22 LAB — 1,25(OH)2D3 SERPL-MCNC: 52 PG/ML (ref 19.9–79.3)

## 2021-11-24 LAB — 17OHP SERPL-MCNC: <10 NG/DL

## 2021-11-25 LAB
SHBG SERPL-SCNC: 187 NMOL/L (ref 30–135)
TESTOST FREE SERPL-MCNC: 0.5 PG/ML (ref 1.1–5.8)
TESTOST SERPL-MCNC: 10 NG/DL (ref 9–55)

## 2021-11-28 DIAGNOSIS — F31.9 BIPOLAR 1 DISORDER (HCC): ICD-10-CM

## 2021-11-29 RX ORDER — OXCARBAZEPINE 150 MG/1
600 TABLET, FILM COATED ORAL
Qty: 120 TABLET | Refills: 0 | Status: SHIPPED | OUTPATIENT
Start: 2021-11-29 | End: 2022-01-12

## 2021-11-29 RX ORDER — QUETIAPINE FUMARATE 25 MG/1
100 TABLET, FILM COATED ORAL
Qty: 120 TABLET | Refills: 0 | Status: SHIPPED | OUTPATIENT
Start: 2021-11-29 | End: 2022-01-12

## 2021-11-30 DIAGNOSIS — F31.9 BIPOLAR 1 DISORDER (HCC): ICD-10-CM

## 2021-12-08 DIAGNOSIS — J45.40 MODERATE PERSISTENT ASTHMA WITHOUT COMPLICATION: ICD-10-CM

## 2022-01-13 ENCOUNTER — TELEPHONE (OUTPATIENT)
Dept: MEDICAL GROUP | Facility: PHYSICIAN GROUP | Age: 42
End: 2022-01-13

## 2022-01-13 NOTE — TELEPHONE ENCOUNTER
MEDICATION PRIOR AUTHORIZATION NEEDED:    1. Name of Medication: Dexilant    2. Requested By (Name of Pharmacy): [facility]     3. Is insurance on file current? yes    4. What is the name & phone number of the 3rd party payor?

## 2022-01-13 NOTE — TELEPHONE ENCOUNTER
DOCUMENTATION OF PAR STATUS:    1. Name of Medication & Dose: Dexilant     2. Name of Prescription Coverage Company & phone #:     3. Date Prior Auth Submitted: 01/14/2021    4. What information was given to obtain insurance decision? all    5. Prior Auth Status? Pending    6. Patient Notified: no

## 2022-01-14 ENCOUNTER — TELEPHONE (OUTPATIENT)
Dept: MEDICAL GROUP | Facility: PHYSICIAN GROUP | Age: 42
End: 2022-01-14

## 2022-01-14 DIAGNOSIS — F31.9 BIPOLAR 1 DISORDER (HCC): ICD-10-CM

## 2022-01-14 RX ORDER — QUETIAPINE FUMARATE 25 MG/1
100 TABLET, FILM COATED ORAL
Qty: 120 TABLET | Refills: 0 | Status: SHIPPED | OUTPATIENT
Start: 2022-01-14

## 2022-01-14 RX ORDER — OXCARBAZEPINE 150 MG/1
600 TABLET, FILM COATED ORAL
Qty: 120 TABLET | Refills: 0 | Status: SHIPPED | OUTPATIENT
Start: 2022-01-14 | End: 2022-12-08 | Stop reason: SDUPTHER

## 2022-01-14 NOTE — TELEPHONE ENCOUNTER
FINAL PRIOR AUTHORIZATION STATUS:    1.  Name of Medication & Dose: Dexilant Cap 30mg Dr     2. Prior Auth Status: Denied.  Reason: [employer]- not covered benefit and excluded from coverage    3. Action Taken: Pharmacy Notified: N\A Patient Notified: N\A

## 2022-01-24 ENCOUNTER — TELEPHONE (OUTPATIENT)
Dept: MEDICAL GROUP | Facility: PHYSICIAN GROUP | Age: 42
End: 2022-01-24

## 2022-01-24 NOTE — TELEPHONE ENCOUNTER
FINAL PRIOR AUTHORIZATION STATUS:    1.  Name of Medication & Dose: dexilant     2. Prior Auth Status: Denied.  Reason: not meeting prior auth reqquiremnets    3. Action Taken: Pharmacy Notified: no Patient Notified: no

## 2022-03-09 ENCOUNTER — TELEPHONE (OUTPATIENT)
Dept: OBGYN | Facility: CLINIC | Age: 42
End: 2022-03-09
Payer: COMMERCIAL

## 2022-03-09 NOTE — TELEPHONE ENCOUNTER
1150 03/09/2022  Pharmacy was called because of a fax that stated a need for clarification, but did not say why. Pharmacy stated the medication is ready for pick-up.  Tre    1153 03/09/2022  Pt was called and told that he rmedication is ready for .  Tre

## 2022-04-02 ENCOUNTER — APPOINTMENT (OUTPATIENT)
Dept: RADIOLOGY | Facility: MEDICAL CENTER | Age: 42
End: 2022-04-02
Attending: OBSTETRICS & GYNECOLOGY
Payer: COMMERCIAL

## 2022-04-29 ENCOUNTER — GYNECOLOGY VISIT (OUTPATIENT)
Dept: OBGYN | Facility: CLINIC | Age: 42
End: 2022-04-29
Payer: COMMERCIAL

## 2022-04-29 ENCOUNTER — HOSPITAL ENCOUNTER (OUTPATIENT)
Facility: MEDICAL CENTER | Age: 42
End: 2022-04-29
Attending: OBSTETRICS & GYNECOLOGY
Payer: COMMERCIAL

## 2022-04-29 VITALS — SYSTOLIC BLOOD PRESSURE: 125 MMHG | BODY MASS INDEX: 34.37 KG/M2 | WEIGHT: 194 LBS | DIASTOLIC BLOOD PRESSURE: 82 MMHG

## 2022-04-29 DIAGNOSIS — Z01.419 WELL WOMAN EXAM: ICD-10-CM

## 2022-04-29 DIAGNOSIS — L29.2 VULVAR ITCHING: ICD-10-CM

## 2022-04-29 DIAGNOSIS — Z12.39 BREAST SCREENING: ICD-10-CM

## 2022-04-29 PROCEDURE — 87624 HPV HI-RISK TYP POOLED RSLT: CPT

## 2022-04-29 PROCEDURE — 99396 PREV VISIT EST AGE 40-64: CPT | Performed by: OBSTETRICS & GYNECOLOGY

## 2022-04-29 PROCEDURE — 88175 CYTOPATH C/V AUTO FLUID REDO: CPT

## 2022-04-29 NOTE — NON-PROVIDER
Pt here for annual exam  Pt states itchiness, burning has been on going on and off   Good# [phone] (home)   Pharmacy verified

## 2022-04-29 NOTE — PROGRESS NOTES
Please call the patient and tell her it is fine to try the albuterol inhaler to see if this helps the symptoms of chest tightness.  If her chest tightness persisted particularly if she had worsening with chest pain or substernal chest pressure and especially if it radiated into her arm or her neck then she should seek medical attention to make sure the symptoms are not due to a cardiac etiology.  However if she just has intermittent chest tightness without the other aforementioned symptoms and the tightness was relieved by albuterol it is fine to use the albuterol inhaler for this purpose.   Sherry Hatfield is a 41 y.o. No obstetric history on file. female who presents for her Annual Gynecologic Exam      HPI Comments: Pt presents for her annual well woman exam.   Pt reports that she has vulvodynia and has been having a flareup lately.  She tried some over-the-counter hydrocortisone cream which did help with the itching and the pain but was causing burning.    Also has a history of PCOS, currently on risperidone.  Very happy with results.    Patient does report a history of of an abnormal Pap smear in 2019 with biopsies.  None since    No LMP recorded (lmp unknown). (Menstrual status: Irregular Menses).    Review of Systems:   Pertinent positives documented in HPI and all other systems reviewed & are negative    PGYN Hx: As above    All PMH, PSH, allergies, social history and FH reviewed and updated today:  Past Medical History:   Diagnosis Date   • Asthma    • Bipolar 1 disorder (HCC)    • GERD (gastroesophageal reflux disease)    • Migraine headache without aura    • PCOS (polycystic ovarian syndrome)        Past Surgical History:   Procedure Laterality Date   • PRIMARY C SECTION      x2   • TUBAL COAGULATION LAPAROSCOPIC BILATERAL         Medications:   Current Outpatient Medications Ordered in Epic   Medication Sig Dispense Refill   • Drospirenone 4 MG Tab Take 4 mg by mouth every day. 84 Tablet 4   • hydrocortisone 2.5 % Ointment Apply 1 Application topically 2 times a day. 30 g 2   • OXcarbazepine (TRILEPTAL) 150 MG Tab TAKE 4 TABLETS BY MOUTH AT BEDTIME 120 Tablet 0   • QUEtiapine (SEROQUEL) 25 MG Tab TAKE 4 TABLETS BY MOUTH AT BEDTIME 120 Tablet 0   • naratriptan (AMERGE) 2.5 MG tablet Take 1 Tablet by mouth one time as needed for Migraine. 10 Tablet 5   • propranolol (INDERAL) 20 MG Tab Take 2 Tablets by mouth 2 times a day. 120 Tablet 5   • tretinoin (RETIN-A) 0.05 % cream Apply  topically every evening. 45 g 5   • ibuprofen (MOTRIN) 800 MG Tab Take 1 Tablet by mouth every 8 hours as  needed. 90 Tablet 1   • spironolactone (ALDACTONE) 25 MG Tab Take 1 Tablet by mouth 2 times a day. 60 Tablet 5   • clobetasol (OLUX) 0.05 % foam Apply to affected areas on the scalp twice daily 100 g 5   • albuterol 108 (90 Base) MCG/ACT Aero Soln inhalation aerosol Inhale 2 Puffs every 6 hours as needed for Shortness of Breath.     • triamcinolone acetonide (KENALOG) 0.1 % Cream Apply  topically 2 times a day.     • Clobetasol Emul Foam w/MoistCr 0.05 % Kit Apply  topically.     • tizanidine (ZANAFLEX) 2 MG capsule Take 2 mg by mouth 3 times a day as needed.     • tiotropium (SPIRIVA RESPIMAT) 2.5 mcg/Act Aero Soln Inhale 5 mcg every day.     • flunisolide (NASALIDE) 25 MCG/ACT (0.025%) Solution Administer 2 Sprays into affected nostril(S) 2 times a day. 1 Each 5   • fluticasone-salmeterol (ADVAIR HFA) 230-21 MCG/ACT inhaler Inhale 1 Puff 2 times a day. 12 g 5   • dexlansoprazole (DEXILANT) 30 MG CAPSULE DELAYED RELEASE Take 2 Capsules by mouth every day. (Patient not taking: Reported on 4/29/2022) 60 Capsule 5   • tiotropium (SPIRIVA) 18 MCG Cap Place 1 Capsule into inhaler and inhale every day. (Patient not taking: Reported on 4/29/2022) 30 Capsule 5   • promethazine-dextromethorphan (PROMETHAZINE-DM) 6.25-15 MG/5ML syrup Take 5 mL by mouth every four hours as needed for Cough. (Patient not taking: Reported on 4/29/2022) 120 mL 0     No current Epic-ordered facility-administered medications on file.       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   Tobacco Use   • Smoking status: Never Smoker   • Smokeless tobacco: Never Used   Vaping Use   • Vaping Use: Never used   Substance and Sexual Activity   • Alcohol use: Never   • Drug use: Never   • Sexual activity: Yes       Family History   Problem Relation Age of Onset   • GI Disease Mother         liver cirrhosis s/p transplant  due to ETOH   • Diabetes Mother    • Cancer Mother         basal cell cancer   • Alcohol abuse Mother    • Heart Disease Father         CHF   • GI Disease Father         early liver cirrhosis   • Alcohol abuse Father    • Diabetes Father    • Kidney Disease Father         ESRD on dialysis   • Obesity Sister    • Diabetes Sister    • Alcohol abuse Sister    • No Known Problems Brother           Objective:   Vital measurements:  /82 (BP Location: Left arm, Patient Position: Sitting, BP Cuff Size: Adult)   Wt 88 kg (194 lb)   Body mass index is 34.37 kg/m². (Goal BM I>18 <25)    Physical Exam   Nursing note and vitals reviewed.  Constitutional: She is oriented to person, place, and time. She appears well-developed and well-nourished. No distress.     HEENT:   Head: Normocephalic and atraumatic.   Right Ear: External ear normal.   Left Ear: External ear normal.   Nose: Nose normal.   Eyes: Conjunctivae and EOM are normal. Pupils are equal, round, and reactive to light. No scleral icterus.     Neck: Normal range of motion. Neck supple. No tracheal deviation present. No thyromegaly present. No cervical or supraclavicular lymphadenopathy.    Pulmonary/Chest: Effort normal and breath sounds normal. No respiratory distress. She has no wheezes. She has no rales. She exhibits no tenderness.     Cardiovascular: Regular, rate and rhythm. No edema.    Breast: Symmetrical, normal consistency without masses., No dimpling or skin changes, Normal nipples without discharge    Abdominal: Soft. Bowel sounds are normal. She exhibits no distension and no mass. No tenderness. She has no rebound and no guarding.     Genitourinary:  Pelvic exam was performed with patient supine.  External genitalia with no abnormal pigmentation, labial fusion, rashes, tenderness, lesions or injury to the labia bilaterally.  No excoriations or lesions noted.  No evidence of infection  BUS normal  Vagina is pink and moist with no lesions, foul  discharge, erythema, tenderness or bleeding. No foreign body around the vagina or signs of injury.   Cervix exhibits no motion tenderness, no discharge and no friability, no lesions.   Uterus is 8 cm and not deviated, not enlarged, not fixed and not tender.  Right adnexa displays no mass, no tenderness and no fullness.  Left adnexa displays no mass, no tenderness and no fullness.     Musculoskeletal: Normal range of motion. Non tender. She exhibits no edema and no tenderness.     Lymphadenopathy: She has no cervical or supraclavicular adenopathy.     Neurological: She is alert and oriented to person, place, and time. She exhibits normal muscle tone.     Skin: Skin is warm and dry. No rash noted. She is not diaphoretic. No erythema. No pallor.     Psychiatric: She has a normal mood and affect. Her behavior is normal. Judgment and thought content normal.        Assessment:     1. Well woman exam  MA-SCREENING MAMMO BILAT W/TOMOSYNTHESIS W/CAD   2. Breast screening  MA-SCREENING MAMMO BILAT W/TOMOSYNTHESIS W/CAD         Plan:   Pap and physical exam performed.  Will call patient with results  Self breast awareness discussed.  Encouraged exercise and proper diet.  Mammograms starting @ age 40 annually.  See medications and orders placed in encounter report.  Follow up in 1 year for annual exam or sooner as needed    Will begin therapy at this time with hydrocortisone ointment.  If no improvement, discussed the patient about the possibility of performing a biopsy as this has never been performed before    All questions answered

## 2022-05-02 LAB
CYTOLOGY REG CYTOL: NORMAL
HPV HR 12 DNA CVX QL NAA+PROBE: NEGATIVE
HPV16 DNA SPEC QL NAA+PROBE: NEGATIVE
HPV18 DNA SPEC QL NAA+PROBE: NEGATIVE
SPECIMEN SOURCE: NORMAL

## 2022-05-11 ENCOUNTER — TELEPHONE (OUTPATIENT)
Dept: OBGYN | Facility: CLINIC | Age: 42
End: 2022-05-11
Payer: COMMERCIAL

## 2022-05-11 NOTE — TELEPHONE ENCOUNTER
Pt calls back and now states that she has the coupon for pharmacy and as long as this rx is written for 90 days she can use card for 25$. Prior auth paperwork will be scanned incase.  L/M for pt to please return tc to discuss previous RX;s. Will be attempting prior auth for her OCP drosperinone ([facility]). TMJACQUELINE

## 2022-05-16 ENCOUNTER — APPOINTMENT (OUTPATIENT)
Dept: OBGYN | Facility: CLINIC | Age: 42
End: 2022-05-16
Payer: COMMERCIAL

## 2022-05-20 ENCOUNTER — TELEPHONE (OUTPATIENT)
Dept: MEDICAL GROUP | Facility: PHYSICIAN GROUP | Age: 42
End: 2022-05-20
Payer: COMMERCIAL

## 2022-05-20 DIAGNOSIS — L98.9 SKIN DISORDER: ICD-10-CM

## 2022-05-20 RX ORDER — CLOBETASOL PROPIONATE 0.5 MG/G
AEROSOL, FOAM TOPICAL
Qty: 100 G | Refills: 0 | Status: SHIPPED | OUTPATIENT
Start: 2022-05-20 | End: 2022-06-02 | Stop reason: SDUPTHER

## 2022-05-20 NOTE — TELEPHONE ENCOUNTER
DOCUMENTATION OF PAR STATUS:    1. Name of Medication & Dose: clobetasol foam     2. Name of Prescription Coverage Company & phone #: [employer]    3. Date Prior Auth Submitted: 05/20/22      4. What information was given to obtain insurance decision? ICD-10    5. Prior Auth Status? Pending    6. Patient Notified: no

## 2022-06-02 DIAGNOSIS — L21.9 SEBORRHEIC DERMATITIS OF SCALP: ICD-10-CM

## 2022-06-02 DIAGNOSIS — L98.9 SKIN DISORDER: ICD-10-CM

## 2022-06-02 RX ORDER — CLOBETASOL PROPIONATE 0.5 MG/G
AEROSOL, FOAM TOPICAL
Qty: 100 G | Refills: 0 | Status: SHIPPED | OUTPATIENT
Start: 2022-06-02

## 2022-06-08 ENCOUNTER — TELEPHONE (OUTPATIENT)
Dept: MEDICAL GROUP | Facility: PHYSICIAN GROUP | Age: 42
End: 2022-06-08
Payer: COMMERCIAL

## 2022-06-08 NOTE — TELEPHONE ENCOUNTER
MEDICATION PRIOR AUTHORIZATION NEEDED:    1. Name of Medication: Olux foam    2. Requested By (Name of Pharmacy): [facility]     3. Is insurance on file current? yes    4. What is the name & phone number of the 3rd party payor?

## 2022-06-08 NOTE — TELEPHONE ENCOUNTER
FINAL PRIOR AUTHORIZATION STATUS:    1.  Name of Medication & Dose: Clobetasol Propionate 0.05%foam     2. Prior Auth Status: Denied.  Reason: did not meet  clinical requirements    3. Action Taken: Pharmacy Notified: no Patient Notified: no

## 2022-06-08 NOTE — TELEPHONE ENCOUNTER
DOCUMENTATION OF PAR STATUS:    1. Name of Medication & Dose: Olux foam     2. Name of Prescription Coverage Company & phone #:     3. Date Prior Auth Submitted: 06/08/2022    4. What information was given to obtain insurance decision?     5. Prior Auth Status? Pending    6. Patient Notified: no

## 2022-06-10 RX ORDER — ACETAMINOPHEN AND CODEINE PHOSPHATE 120; 12 MG/5ML; MG/5ML
1 SOLUTION ORAL DAILY
Qty: 28 TABLET | Refills: 12 | Status: SHIPPED | OUTPATIENT
Start: 2022-06-10 | End: 2022-12-08

## 2022-06-14 DIAGNOSIS — G43.019 INTRACTABLE MIGRAINE WITHOUT AURA AND WITHOUT STATUS MIGRAINOSUS: ICD-10-CM

## 2022-06-14 DIAGNOSIS — E28.2 PCOS (POLYCYSTIC OVARIAN SYNDROME): ICD-10-CM

## 2022-06-14 RX ORDER — PROPRANOLOL HYDROCHLORIDE 20 MG/1
40 TABLET ORAL 2 TIMES DAILY
Qty: 360 TABLET | Refills: 0 | Status: SHIPPED | OUTPATIENT
Start: 2022-06-14 | End: 2022-09-02

## 2022-06-14 RX ORDER — SPIRONOLACTONE 25 MG/1
25 TABLET ORAL 2 TIMES DAILY
Qty: 180 TABLET | Refills: 0 | Status: SHIPPED | OUTPATIENT
Start: 2022-06-14 | End: 2022-09-02

## 2022-06-30 ENCOUNTER — OFFICE VISIT (OUTPATIENT)
Dept: MEDICAL GROUP | Facility: PHYSICIAN GROUP | Age: 42
End: 2022-06-30
Payer: COMMERCIAL

## 2022-06-30 VITALS
SYSTOLIC BLOOD PRESSURE: 110 MMHG | WEIGHT: 188.49 LBS | HEIGHT: 63 IN | HEART RATE: 74 BPM | TEMPERATURE: 97.4 F | DIASTOLIC BLOOD PRESSURE: 80 MMHG | OXYGEN SATURATION: 96 % | BODY MASS INDEX: 33.4 KG/M2

## 2022-06-30 DIAGNOSIS — G43.009 MIGRAINE WITHOUT AURA AND WITHOUT STATUS MIGRAINOSUS, NOT INTRACTABLE: ICD-10-CM

## 2022-06-30 DIAGNOSIS — R68.2 DRY MOUTH: ICD-10-CM

## 2022-06-30 DIAGNOSIS — J45.40 MODERATE PERSISTENT ASTHMA WITHOUT COMPLICATION: ICD-10-CM

## 2022-06-30 DIAGNOSIS — R25.1 TREMOR: ICD-10-CM

## 2022-06-30 DIAGNOSIS — L40.9 PSORIASIS OF SCALP: ICD-10-CM

## 2022-06-30 DIAGNOSIS — F31.9 BIPOLAR 1 DISORDER (HCC): ICD-10-CM

## 2022-06-30 DIAGNOSIS — E28.2 PCOS (POLYCYSTIC OVARIAN SYNDROME): ICD-10-CM

## 2022-06-30 DIAGNOSIS — K21.9 GASTROESOPHAGEAL REFLUX DISEASE, UNSPECIFIED WHETHER ESOPHAGITIS PRESENT: ICD-10-CM

## 2022-06-30 DIAGNOSIS — G43.019 INTRACTABLE MIGRAINE WITHOUT AURA AND WITHOUT STATUS MIGRAINOSUS: ICD-10-CM

## 2022-06-30 PROCEDURE — 99214 OFFICE O/P EST MOD 30 MIN: CPT | Performed by: FAMILY MEDICINE

## 2022-06-30 RX ORDER — ONDANSETRON 4 MG/1
TABLET, FILM COATED ORAL
COMMUNITY
Start: 2022-05-12

## 2022-06-30 RX ORDER — BUPROPION HYDROCHLORIDE 300 MG/1
TABLET ORAL
COMMUNITY
Start: 2022-06-27

## 2022-06-30 RX ORDER — FLUTICASONE PROPIONATE AND SALMETEROL XINAFOATE 115; 21 UG/1; UG/1
AEROSOL, METERED RESPIRATORY (INHALATION)
COMMUNITY
Start: 2022-06-14

## 2022-06-30 RX ORDER — TIOTROPIUM BROMIDE INHALATION SPRAY 1.56 UG/1
SPRAY, METERED RESPIRATORY (INHALATION)
COMMUNITY
Start: 2022-06-14

## 2022-06-30 RX ORDER — HYDROXYZINE HYDROCHLORIDE 25 MG/1
TABLET, FILM COATED ORAL
COMMUNITY
Start: 2022-06-15

## 2022-06-30 RX ORDER — OMEPRAZOLE 20 MG/1
20 CAPSULE, DELAYED RELEASE ORAL 2 TIMES DAILY
Qty: 180 CAPSULE | Refills: 1 | Status: SHIPPED | OUTPATIENT
Start: 2022-06-30 | End: 2022-12-08 | Stop reason: SDUPTHER

## 2022-06-30 NOTE — PROGRESS NOTES
Subjective     Sherry Hatfield is a 42 y.o. female who presents with Follow-Up            HPI     Patient returns for follow-up of her medical problems.  I saw her as a new patient in August 2021 as a virtual visit.    Since that visit, she has been to the allergy and asthma specialist for her moderate persistent asthma.  She is on Spiriva, Advair, albuterol HFA.  Her asthma is currently stable and under control on the inhalers.    She has [facility] with [facility] and had blood work done to work her up for PCOS and results came back PCOS.  She is on spironolactone and progesterone only pill to manage her problem.  She was sent for pelvic ultrasound but she said her insurance does not cover it and so she will wait when she has a new insurance coverage.  She also had her Pap smear done which came back no evidence of malignancy and negative HPV back in April 2022.  She has yet to do her screening mammogram.  She said she will go back and see the GYN for biopsy of the vulvar area because of ongoing burning sensation.    She was able to establish with Dr. Moya, psychiatrist for her bipolar disorder.  She is now on Trileptal 150 mg and she said she takes 7 tablets/day, Seroquel 25 mg and she takes 5 tablets/day, Wellbutrin  mg/day and hydroxyzine 25 mg as needed.  She is currently doing very well on her regimen.      We have been trying to get her insurance to approve clobetasol foam to manage her scalp psoriasis.  So far the insurance does not want to cover this.  She has not been able to set up appointment yet with dermatology.  She is trying to get the medication through [facility].    In terms of her migraine headache, she continues to take propranolol as prophylactic treatment.  She has Amerge that she uses as needed for breakthrough migraine headache.  She said she has about 6 episodes per month.  She has smaller headaches that she manages with ibuprofen.  She has not been able to set up an  "appointment yet with the neurology clinic.    Her insurance did not cover Dexilant for her GERD.  This is the PPI that worked very well for her.  She said the Trileptal makes her GERD worse.  She would like to get omeprazole which she said has helped in the past although not as good as Dexilant while waiting to get her new insurance.  She said she has to take it twice daily to get her GERD symptoms under control.    She also complains of dry mouth in the last 2 months.  She said she has difficulty swallowing because of the dry mouth.  She also has noticed increase in her tremors.  Initially she was only having tremors of her pinky but now she has them in both hands right more than the left.    Past medical history, past surgical history, family history reviewed-no changes    Social history reviewed-no changes    Allergies reviewed-no changes    Medications reviewed-no changes      ROS    As per HPI, the rest are negative.         Objective     /80 (BP Location: Left arm, Patient Position: Sitting, BP Cuff Size: Adult)   Pulse 74   Temp 36.3 °C (97.4 °F) (Temporal)   Ht 1.6 m (5' 3\")   Wt 85.5 kg (188 lb 7.9 oz)   SpO2 96%   BMI 33.39 kg/m²      Physical Exam     Examined alert, awake, oriented, not in distress    Neck-supple, no lymphadenopathy, no thyromegaly  Lungs-clear to auscultation, no rales, no wheezes  Heart-regular rate and rhythm, no murmur  Extremities-no edema, clubbing, cyanosis  Neuro exam-no tremors noted in the hands                        Assessment & Plan        1. Moderate persistent asthma without complication  Currently under control on her inhalers.  Continue follow-up with allergist/asthma specialist.    2. PCOS (polycystic ovarian syndrome)  She is on progesterone only pill and spironolactone followed by the GYN.    3. Migraine without aura and without status migrainosus, not intractable  Continue propranolol.  She is on Amerge for abortive treatment.    4. Bipolar 1 disorder " (HCC)  She is doing very well and stable on her current regimen followed by her psychiatrist.    5. Psoriasis of scalp  She still has to establish with the dermatologist.  She is trying to get help with getting the clobetasol foam through [facility].    6. Gastroesophageal reflux disease, unspecified whether esophagitis present  She wants to try omeprazole 20 mg twice a day which she said would be better than not taking anything.  Her insurance did not want to cover the Dexilant.  She will wait until she has new insurance to get the Dexilant.  - omeprazole (PRILOSEC) 20 MG delayed-release capsule; Take 1 Capsule by mouth 2 times a day.  Dispense: 180 Capsule; Refill: 1    7. Dry mouth  We will do work-up to check for thyroid disorder, metabolic disorder, hematologic problems.  She already had a TSH more than 6 months ago which was normal but her dry mouth started only about 2 months ago.  We will also check MARTI to rule out autoimmune disease.  - TSH; Future  - Comp Metabolic Panel; Future  - CBC WITH DIFFERENTIAL; Future  - Lipid Profile; Future  - MARTI IGG EVAN W/RFLX TO MARTI IGG IFA; Future    8. Tremor  We will check TSH to rule out thyroid problem and we will also check for metabolic or hematologic problems.  She will eventually set up an appointment with neurology for her migraine headache and they can also evaluate her for the tremors.  I will communicate results to her.  - TSH; Future  - Comp Metabolic Panel; Future  - CBC WITH DIFFERENTIAL; Future  - Lipid Profile; Future  - MARTI IGG EVAN W/RFLX TO MARTI IGG IFA; Future    I made her aware that I am relocating and so I will be leaving the practice with my last day on September 1.  She will have to establish care with another PCP.  Patient voiced understanding.      Please note that this dictation was created using voice recognition software. I have worked with consultants from the vendor as well as technical experts from [employer] to optimize the interface. I  have made every reasonable attempt to correct obvious errors, but I expect that there are errors of grammar and possibly content I did not discover before finalizing the note.

## 2022-07-09 ENCOUNTER — HOSPITAL ENCOUNTER (OUTPATIENT)
Dept: LAB | Facility: MEDICAL CENTER | Age: 42
End: 2022-07-09
Attending: FAMILY MEDICINE
Payer: COMMERCIAL

## 2022-07-09 DIAGNOSIS — R68.2 DRY MOUTH: ICD-10-CM

## 2022-07-09 DIAGNOSIS — R25.1 TREMOR: ICD-10-CM

## 2022-07-09 LAB
ALBUMIN SERPL BCP-MCNC: 4.4 G/DL (ref 3.2–4.9)
ALBUMIN/GLOB SERPL: 1.7 G/DL
ALP SERPL-CCNC: 82 U/L (ref 30–99)
ALT SERPL-CCNC: 20 U/L (ref 2–50)
ANION GAP SERPL CALC-SCNC: 11 MMOL/L (ref 7–16)
AST SERPL-CCNC: 18 U/L (ref 12–45)
BASOPHILS # BLD AUTO: 0.6 % (ref 0–1.8)
BASOPHILS # BLD: 0.05 K/UL (ref 0–0.12)
BILIRUB SERPL-MCNC: 0.4 MG/DL (ref 0.1–1.5)
BUN SERPL-MCNC: 9 MG/DL (ref 8–22)
CALCIUM SERPL-MCNC: 9.1 MG/DL (ref 8.5–10.5)
CHLORIDE SERPL-SCNC: 103 MMOL/L (ref 96–112)
CHOLEST SERPL-MCNC: 217 MG/DL (ref 100–199)
CO2 SERPL-SCNC: 23 MMOL/L (ref 20–33)
CREAT SERPL-MCNC: 0.53 MG/DL (ref 0.5–1.4)
EOSINOPHIL # BLD AUTO: 0.22 K/UL (ref 0–0.51)
EOSINOPHIL NFR BLD: 2.7 % (ref 0–6.9)
ERYTHROCYTE [DISTWIDTH] IN BLOOD BY AUTOMATED COUNT: 40.5 FL (ref 35.9–50)
FASTING STATUS PATIENT QL REPORTED: NORMAL
GFR SERPLBLD CREATININE-BSD FMLA CKD-EPI: 118 ML/MIN/1.73 M 2
GLOBULIN SER CALC-MCNC: 2.6 G/DL (ref 1.9–3.5)
GLUCOSE SERPL-MCNC: 208 MG/DL (ref 65–99)
HCT VFR BLD AUTO: 39.7 % (ref 37–47)
HDLC SERPL-MCNC: 40 MG/DL
HGB BLD-MCNC: 13.3 G/DL (ref 12–16)
IMM GRANULOCYTES # BLD AUTO: 0.05 K/UL (ref 0–0.11)
IMM GRANULOCYTES NFR BLD AUTO: 0.6 % (ref 0–0.9)
LDLC SERPL CALC-MCNC: 146 MG/DL
LYMPHOCYTES # BLD AUTO: 3.63 K/UL (ref 1–4.8)
LYMPHOCYTES NFR BLD: 43.9 % (ref 22–41)
MCH RBC QN AUTO: 29.8 PG (ref 27–33)
MCHC RBC AUTO-ENTMCNC: 33.5 G/DL (ref 33.6–35)
MCV RBC AUTO: 88.8 FL (ref 81.4–97.8)
MONOCYTES # BLD AUTO: 0.5 K/UL (ref 0–0.85)
MONOCYTES NFR BLD AUTO: 6 % (ref 0–13.4)
NEUTROPHILS # BLD AUTO: 3.82 K/UL (ref 2–7.15)
NEUTROPHILS NFR BLD: 46.2 % (ref 44–72)
NRBC # BLD AUTO: 0 K/UL
NRBC BLD-RTO: 0 /100 WBC
PLATELET # BLD AUTO: 365 K/UL (ref 164–446)
PMV BLD AUTO: 9.8 FL (ref 9–12.9)
POTASSIUM SERPL-SCNC: 3.7 MMOL/L (ref 3.6–5.5)
PROT SERPL-MCNC: 7 G/DL (ref 6–8.2)
RBC # BLD AUTO: 4.47 M/UL (ref 4.2–5.4)
SODIUM SERPL-SCNC: 137 MMOL/L (ref 135–145)
TRIGL SERPL-MCNC: 156 MG/DL (ref 0–149)
TSH SERPL DL<=0.005 MIU/L-ACNC: 1.91 UIU/ML (ref 0.38–5.33)
WBC # BLD AUTO: 8.3 K/UL (ref 4.8–10.8)

## 2022-07-09 PROCEDURE — 80053 COMPREHEN METABOLIC PANEL: CPT

## 2022-07-09 PROCEDURE — 80061 LIPID PANEL: CPT

## 2022-07-09 PROCEDURE — 85025 COMPLETE CBC W/AUTO DIFF WBC: CPT

## 2022-07-09 PROCEDURE — 36415 COLL VENOUS BLD VENIPUNCTURE: CPT

## 2022-07-09 PROCEDURE — 86038 ANTINUCLEAR ANTIBODIES: CPT

## 2022-07-09 PROCEDURE — 84443 ASSAY THYROID STIM HORMONE: CPT

## 2022-07-12 DIAGNOSIS — R73.9 HYPERGLYCEMIA: ICD-10-CM

## 2022-07-12 DIAGNOSIS — G43.019 INTRACTABLE MIGRAINE WITHOUT AURA AND WITHOUT STATUS MIGRAINOSUS: ICD-10-CM

## 2022-07-12 LAB — NUCLEAR IGG SER QL IA: NORMAL

## 2022-07-13 RX ORDER — NARATRIPTAN 2.5 MG/1
2.5 TABLET ORAL
Qty: 10 TABLET | Refills: 0 | Status: SHIPPED | OUTPATIENT
Start: 2022-07-13 | End: 2022-08-08

## 2022-07-23 ENCOUNTER — HOSPITAL ENCOUNTER (OUTPATIENT)
Dept: LAB | Facility: MEDICAL CENTER | Age: 42
End: 2022-07-23
Attending: FAMILY MEDICINE
Payer: COMMERCIAL

## 2022-07-23 DIAGNOSIS — R73.9 HYPERGLYCEMIA: ICD-10-CM

## 2022-07-23 LAB
EST. AVERAGE GLUCOSE BLD GHB EST-MCNC: 212 MG/DL
HBA1C MFR BLD: 9 % (ref 4–5.6)

## 2022-07-23 PROCEDURE — 83036 HEMOGLOBIN GLYCOSYLATED A1C: CPT

## 2022-07-23 PROCEDURE — 36415 COLL VENOUS BLD VENIPUNCTURE: CPT

## 2022-07-24 DIAGNOSIS — E78.5 DYSLIPIDEMIA: ICD-10-CM

## 2022-07-24 RX ORDER — ATORVASTATIN CALCIUM 20 MG/1
20 TABLET, FILM COATED ORAL NIGHTLY
Qty: 90 TABLET | Refills: 1 | Status: SHIPPED | OUTPATIENT
Start: 2022-07-24 | End: 2023-01-03 | Stop reason: SDUPTHER

## 2022-08-16 RX ORDER — METFORMIN HYDROCHLORIDE 500 MG/1
1000 TABLET, EXTENDED RELEASE ORAL DAILY
Qty: 60 TABLET | Refills: 5 | Status: SHIPPED | OUTPATIENT
Start: 2022-08-16 | End: 2022-12-08 | Stop reason: SDUPTHER

## 2022-12-08 ENCOUNTER — OFFICE VISIT (OUTPATIENT)
Dept: MEDICAL GROUP | Facility: PHYSICIAN GROUP | Age: 42
End: 2022-12-08
Payer: COMMERCIAL

## 2022-12-08 ENCOUNTER — HOSPITAL ENCOUNTER (OUTPATIENT)
Dept: LAB | Facility: MEDICAL CENTER | Age: 42
End: 2022-12-08
Attending: FAMILY MEDICINE
Payer: COMMERCIAL

## 2022-12-08 VITALS
HEART RATE: 76 BPM | WEIGHT: 192 LBS | DIASTOLIC BLOOD PRESSURE: 76 MMHG | TEMPERATURE: 97.9 F | HEIGHT: 63 IN | RESPIRATION RATE: 14 BRPM | SYSTOLIC BLOOD PRESSURE: 118 MMHG | BODY MASS INDEX: 34.02 KG/M2 | OXYGEN SATURATION: 98 %

## 2022-12-08 DIAGNOSIS — E11.9 DIABETES MELLITUS TYPE 2, NONINSULIN DEPENDENT (HCC): ICD-10-CM

## 2022-12-08 DIAGNOSIS — K21.9 GASTROESOPHAGEAL REFLUX DISEASE, UNSPECIFIED WHETHER ESOPHAGITIS PRESENT: ICD-10-CM

## 2022-12-08 DIAGNOSIS — E11.65 TYPE 2 DIABETES MELLITUS WITH HYPERGLYCEMIA, WITHOUT LONG-TERM CURRENT USE OF INSULIN (HCC): ICD-10-CM

## 2022-12-08 DIAGNOSIS — E78.5 DYSLIPIDEMIA: ICD-10-CM

## 2022-12-08 DIAGNOSIS — F31.9 BIPOLAR 1 DISORDER (HCC): ICD-10-CM

## 2022-12-08 DIAGNOSIS — Z51.81 ENCOUNTER FOR MEDICATION MONITORING: ICD-10-CM

## 2022-12-08 DIAGNOSIS — Z23 NEED FOR VACCINATION: ICD-10-CM

## 2022-12-08 PROBLEM — L29.2 VULVAR ITCHING: Status: RESOLVED | Noted: 2022-04-29 | Resolved: 2022-12-08

## 2022-12-08 LAB
ANION GAP SERPL CALC-SCNC: 11 MMOL/L (ref 7–16)
BUN SERPL-MCNC: 10 MG/DL (ref 8–22)
CALCIUM SERPL-MCNC: 9.8 MG/DL (ref 8.5–10.5)
CHLORIDE SERPL-SCNC: 108 MMOL/L (ref 96–112)
CHOLEST SERPL-MCNC: 147 MG/DL (ref 100–199)
CO2 SERPL-SCNC: 23 MMOL/L (ref 20–33)
CREAT SERPL-MCNC: 0.54 MG/DL (ref 0.5–1.4)
CREAT UR-MCNC: 177.54 MG/DL
EST. AVERAGE GLUCOSE BLD GHB EST-MCNC: 140 MG/DL
FASTING STATUS PATIENT QL REPORTED: NORMAL
GFR SERPLBLD CREATININE-BSD FMLA CKD-EPI: 117 ML/MIN/1.73 M 2
GLUCOSE SERPL-MCNC: 113 MG/DL (ref 65–99)
HBA1C MFR BLD: 6.5 % (ref 4–5.6)
HDLC SERPL-MCNC: 39 MG/DL
LDLC SERPL CALC-MCNC: 79 MG/DL
MICROALBUMIN UR-MCNC: 1.9 MG/DL
MICROALBUMIN/CREAT UR: 11 MG/G (ref 0–30)
POTASSIUM SERPL-SCNC: 3.9 MMOL/L (ref 3.6–5.5)
SODIUM SERPL-SCNC: 142 MMOL/L (ref 135–145)
TRIGL SERPL-MCNC: 143 MG/DL (ref 0–149)
TSH SERPL DL<=0.005 MIU/L-ACNC: 1.13 UIU/ML (ref 0.38–5.33)

## 2022-12-08 PROCEDURE — 80061 LIPID PANEL: CPT

## 2022-12-08 PROCEDURE — 99214 OFFICE O/P EST MOD 30 MIN: CPT | Mod: 25 | Performed by: FAMILY MEDICINE

## 2022-12-08 PROCEDURE — 83036 HEMOGLOBIN GLYCOSYLATED A1C: CPT

## 2022-12-08 PROCEDURE — 80048 BASIC METABOLIC PNL TOTAL CA: CPT

## 2022-12-08 PROCEDURE — 90686 IIV4 VACC NO PRSV 0.5 ML IM: CPT | Performed by: FAMILY MEDICINE

## 2022-12-08 PROCEDURE — 84443 ASSAY THYROID STIM HORMONE: CPT

## 2022-12-08 PROCEDURE — 90471 IMMUNIZATION ADMIN: CPT | Performed by: FAMILY MEDICINE

## 2022-12-08 PROCEDURE — 82043 UR ALBUMIN QUANTITATIVE: CPT

## 2022-12-08 PROCEDURE — 36415 COLL VENOUS BLD VENIPUNCTURE: CPT

## 2022-12-08 PROCEDURE — 82570 ASSAY OF URINE CREATININE: CPT

## 2022-12-08 RX ORDER — OMEPRAZOLE 20 MG/1
40 CAPSULE, DELAYED RELEASE ORAL 2 TIMES DAILY
Qty: 180 CAPSULE | Refills: 3
Start: 2022-12-08 | End: 2023-01-03 | Stop reason: SDUPTHER

## 2022-12-08 RX ORDER — METFORMIN HYDROCHLORIDE 500 MG/1
1000 TABLET, EXTENDED RELEASE ORAL 2 TIMES DAILY
Qty: 180 TABLET | Refills: 3 | Status: SHIPPED | OUTPATIENT
Start: 2022-12-08 | End: 2023-01-13 | Stop reason: SDUPTHER

## 2022-12-08 RX ORDER — OXCARBAZEPINE 150 MG/1
TABLET, FILM COATED ORAL
Qty: 180 TABLET | Refills: 3
Start: 2022-12-08 | End: 2023-08-08

## 2022-12-08 ASSESSMENT — FIBROSIS 4 INDEX: FIB4 SCORE: 0.46

## 2022-12-08 NOTE — PROGRESS NOTES
"CHIEF COMPLAINT / REASON FOR VISIT  Sherry Hatfield is a 42 y.o. female that presents today to [facility] care.    HISTORY OF PRESENT ILLNESS  Bipolar 1 disorder-follows regularly with psychiatry, on Wellbutrin 300 mg daily, quetiapine 100 mg nightly, oxcarbazepine 300 mg in morning, 600 mg in the evening  Asthma-follows with asthma specialist, on Advair, Spiriva, and albuterol as needed  Type 2 diabetes mellitus -uncontrolled, last hemoglobin A1c 9.0 on 2022.  On metformin 1000 mg daily  Hypercholesterolemia-last LDL-C 146 on 2022, triglycerides 156  PCOS-follows with gynecology, on drospirenone 4 mg daily, spironolactone 25 mg daily  Migraines-as needed naratriptan for acute treatment, propranolol for 20 mg twice daily for prophylaxis  GERD - omeprazole 40 mg BID, famotidine    Past Surgical History  Tubal ligation   x2    Social History     Tobacco Use    Smoking status: Never    Smokeless tobacco: Never   Vaping Use    Vaping Use: Never used   Substance Use Topics    Alcohol use: Never    Drug use: Never       OBJECTIVE    /76 (BP Location: Right arm, Patient Position: Sitting, BP Cuff Size: Adult)   Pulse 76   Temp 36.6 °C (97.9 °F) (Temporal)   Resp 14   Ht 1.6 m (5' 3\")   Wt 87.1 kg (192 lb)   SpO2 98%   BMI 34.01 kg/m²  Body mass index is 34.01 kg/m².    PHYSICAL EXAM  Constitutional: Sitting comfortably, in no acute distress, responds to questions appropriately.  Head: Normocephalic  Eyes:  No conjunctival injection, no scleral icterus, PERRL  Ears: External ear canals clear, TMs pearly grey with visualized bony landmarks and crisp light reflex  Mouth: Oral mucosa moist. Good dentition  Throat: Oropharynx clear without erythema or tonsillar exudates  Neck: No cervical or supraclavicular lymphadenopathy  Heart: Regular S1 S2, no murmurs, rub, or gallops  Lungs: Clear to auscultation bilaterally, no wheezes, rales, or rhonchi  Extremities: No lower extremity edema. 2+ " symmetric radial pulses. Sensation intact to monofilament 5/5 spots bilateral soles  Skin: Warm and dry, no rashes or lesions on face or exposed upper extremities    ASSESSMENT & PLAN  1. Type 2 diabetes mellitus with hyperglycemia, without long-term current use of insulin (HCC)  Uncontrolled, last hemoglobin A1c 9.0 on 7/23/2022.  On metformin 1000 mg daily.  We will update hemoglobin A1c, increase metformin to 1000 mg twice daily, and start Jardiance 10 mg daily.  Follow-up in 2 months.  - POCT Retinal Eye Exam  - Diabetic Monofilament Lower Extremity Exam  - metFORMIN ER (GLUCOPHAGE XR) 500 MG TABLET SR 24 HR; Take 2 Tablets by mouth 2 times a day.  Dispense: 180 Tablet; Refill: 3  - Empagliflozin 10 MG Tab; Take 1 Tablet by mouth every day.  Dispense: 30 Tablet; Refill: 3  - HEMOGLOBIN A1C; Future  - Microalbumin Creat Ratio Urine - Clinic Collect; Future    2. Bipolar 1 disorder (HCC)  follows regularly with psychiatry, on Wellbutrin, quetiapine, and oxcarbazepine  - OXcarbazepine (TRILEPTAL) 150 MG Tab; 300 mg in morning, 600 mg at night  Dispense: 180 Tablet; Refill: 3    3. Gastroesophageal reflux disease, unspecified whether esophagitis present  Chronic, uncontrolled.  Consider referral for EGD in future.  Not currently having any dysphagia or red flag symptoms  - omeprazole (PRILOSEC) 20 MG delayed-release capsule; Take 2 Capsules by mouth 2 times a day.  Dispense: 180 Capsule; Refill: 3    4. Dyslipidemia  Currently taking atorvastatin 20 mg nightly, repeat lipid panel  - Lipid Profile; Future    5. Encounter for medication monitoring  - OXcarbazepine (TRILEPTAL) 150 MG Tab; 300 mg in morning, 600 mg at night  Dispense: 180 Tablet; Refill: 3  - Basic Metabolic Panel; Future  - TSH WITH REFLEX TO FT4; Future    6. Need for vaccination  - INFLUENZA VACCINE QUAD INJ (PF)    Follow-up in 2 months

## 2023-01-03 DIAGNOSIS — E78.5 DYSLIPIDEMIA: ICD-10-CM

## 2023-01-03 DIAGNOSIS — K21.9 GASTROESOPHAGEAL REFLUX DISEASE, UNSPECIFIED WHETHER ESOPHAGITIS PRESENT: ICD-10-CM

## 2023-01-03 RX ORDER — OMEPRAZOLE 40 MG/1
40 CAPSULE, DELAYED RELEASE ORAL 2 TIMES DAILY
Qty: 180 CAPSULE | Refills: 3 | Status: SHIPPED | OUTPATIENT
Start: 2023-01-03 | End: 2023-02-16

## 2023-01-03 RX ORDER — ATORVASTATIN CALCIUM 20 MG/1
20 TABLET, FILM COATED ORAL NIGHTLY
Qty: 90 TABLET | Refills: 3 | Status: SHIPPED | OUTPATIENT
Start: 2023-01-03 | End: 2023-01-04 | Stop reason: SDUPTHER

## 2023-01-03 NOTE — TELEPHONE ENCOUNTER
Received request via: Patienthi there,       I am messaging to request refills for the following prescriptions.     Atrovastatin 20 mg   Omeprazole can you please increase to 40 mg 2x a day     Thank you   Sherry      Was the patient seen in the last year in this department? Yes    Does the patient have an active prescription (recently filled or refills available) for medication(s) requested? No    Does the patient have snf Plus and need 100 day supply (blood pressure, diabetes and cholesterol meds only)? Patient does not have SCP

## 2023-01-04 DIAGNOSIS — E78.5 DYSLIPIDEMIA: ICD-10-CM

## 2023-01-04 RX ORDER — ATORVASTATIN CALCIUM 20 MG/1
20 TABLET, FILM COATED ORAL NIGHTLY
Qty: 90 TABLET | Refills: 3 | Status: SHIPPED | OUTPATIENT
Start: 2023-01-04 | End: 2023-01-13 | Stop reason: SDUPTHER

## 2023-01-13 DIAGNOSIS — E78.5 DYSLIPIDEMIA: ICD-10-CM

## 2023-01-13 DIAGNOSIS — K21.9 GASTROESOPHAGEAL REFLUX DISEASE, UNSPECIFIED WHETHER ESOPHAGITIS PRESENT: ICD-10-CM

## 2023-01-13 DIAGNOSIS — E11.9 DIABETES MELLITUS TYPE 2, NONINSULIN DEPENDENT (HCC): ICD-10-CM

## 2023-01-13 DIAGNOSIS — E11.65 TYPE 2 DIABETES MELLITUS WITH HYPERGLYCEMIA, WITHOUT LONG-TERM CURRENT USE OF INSULIN (HCC): ICD-10-CM

## 2023-01-13 RX ORDER — OMEPRAZOLE 40 MG/1
40 CAPSULE, DELAYED RELEASE ORAL 2 TIMES DAILY
Qty: 180 CAPSULE | Refills: 3 | Status: CANCELLED | OUTPATIENT
Start: 2023-01-13

## 2023-01-13 RX ORDER — METFORMIN HYDROCHLORIDE 500 MG/1
1000 TABLET, EXTENDED RELEASE ORAL 2 TIMES DAILY
Qty: 360 TABLET | Refills: 3 | Status: SHIPPED | OUTPATIENT
Start: 2023-01-13 | End: 2023-03-20 | Stop reason: SDUPTHER

## 2023-01-13 RX ORDER — ATORVASTATIN CALCIUM 20 MG/1
20 TABLET, FILM COATED ORAL NIGHTLY
Qty: 90 TABLET | Refills: 3 | Status: SHIPPED | OUTPATIENT
Start: 2023-01-13 | End: 2023-09-19 | Stop reason: SDUPTHER

## 2023-01-13 NOTE — TELEPHONE ENCOUNTER
Received request via: Pharmacy    Was the patient seen in the last year in this department? Yes    Does the patient have an active prescription (recently filled or refills available) for medication(s) requested? No    Does the patient have intermediate Plus and need 100 day supply (blood pressure, diabetes and cholesterol meds only)? Patient does not have SCP

## 2023-01-13 NOTE — TELEPHONE ENCOUNTER
Received request via: Patient    Was the patient seen in the last year in this department? Yes    Does the patient have an active prescription (recently filled or refills available) for medication(s) requested? No    Does the patient have MCFP Plus and need 100 day supply (blood pressure, diabetes and cholesterol meds only)? Patient does not have SCP

## 2023-02-11 DIAGNOSIS — G43.019 INTRACTABLE MIGRAINE WITHOUT AURA AND WITHOUT STATUS MIGRAINOSUS: ICD-10-CM

## 2023-02-11 DIAGNOSIS — E28.2 PCOS (POLYCYSTIC OVARIAN SYNDROME): ICD-10-CM

## 2023-02-14 RX ORDER — SPIRONOLACTONE 25 MG/1
TABLET ORAL
Qty: 180 TABLET | Refills: 3 | Status: SHIPPED | OUTPATIENT
Start: 2023-02-14

## 2023-02-14 RX ORDER — PROPRANOLOL HYDROCHLORIDE 20 MG/1
TABLET ORAL
Qty: 360 TABLET | Refills: 3 | Status: SHIPPED | OUTPATIENT
Start: 2023-02-14

## 2023-02-16 ENCOUNTER — OFFICE VISIT (OUTPATIENT)
Dept: MEDICAL GROUP | Facility: PHYSICIAN GROUP | Age: 43
End: 2023-02-16
Payer: COMMERCIAL

## 2023-02-16 ENCOUNTER — APPOINTMENT (OUTPATIENT)
Dept: RADIOLOGY | Facility: IMAGING CENTER | Age: 43
End: 2023-02-16
Attending: FAMILY MEDICINE
Payer: COMMERCIAL

## 2023-02-16 VITALS
DIASTOLIC BLOOD PRESSURE: 78 MMHG | RESPIRATION RATE: 14 BRPM | WEIGHT: 194 LBS | HEART RATE: 77 BPM | OXYGEN SATURATION: 95 % | SYSTOLIC BLOOD PRESSURE: 126 MMHG | BODY MASS INDEX: 34.38 KG/M2 | HEIGHT: 63 IN | TEMPERATURE: 97.7 F

## 2023-02-16 DIAGNOSIS — R05.3 CHRONIC COUGH: ICD-10-CM

## 2023-02-16 DIAGNOSIS — J45.40 MODERATE PERSISTENT ASTHMA WITHOUT COMPLICATION: ICD-10-CM

## 2023-02-16 DIAGNOSIS — K21.9 GASTROESOPHAGEAL REFLUX DISEASE, UNSPECIFIED WHETHER ESOPHAGITIS PRESENT: ICD-10-CM

## 2023-02-16 PROCEDURE — 71046 X-RAY EXAM CHEST 2 VIEWS: CPT | Mod: TC | Performed by: PHYSICIAN ASSISTANT

## 2023-02-16 PROCEDURE — 99214 OFFICE O/P EST MOD 30 MIN: CPT | Performed by: FAMILY MEDICINE

## 2023-02-16 RX ORDER — DEXLANSOPRAZOLE 60 MG/1
1 CAPSULE, DELAYED RELEASE ORAL DAILY
Qty: 30 CAPSULE | Refills: 3 | Status: SHIPPED | OUTPATIENT
Start: 2023-02-16 | End: 2023-02-16

## 2023-02-16 RX ORDER — DEXLANSOPRAZOLE 60 MG/1
1 CAPSULE, DELAYED RELEASE ORAL DAILY
Qty: 30 CAPSULE | Refills: 3 | Status: SHIPPED | OUTPATIENT
Start: 2023-02-16 | End: 2023-03-21

## 2023-02-16 ASSESSMENT — FIBROSIS 4 INDEX: FIB4 SCORE: 0.46

## 2023-02-16 NOTE — PROGRESS NOTES
"CHIEF COMPLAINT / REASON FOR VISIT  Sherry Hatfield is a 42 y.o. female that presents today for evaluation of GERD and cough    HISTORY OF PRESENT ILLNESS  Chronic cough for approximately 4-5 months, worse in the past month. Occasionally productive in the morning. Can't sleep due to the cough. Thinks it might be due to GERD. Aggravated by eating, smelling cigarettes, or talking. Reports history of allergies, previously benadryl helped but recently it hasn't helped. Has nasal congestion in morning, not worse than usual. No fevers or chills. Endorses nausea. Endorses dysphagia to liquids and solids, feels like the obstruction is in her upper chest.     History of asthma, takes Advair inhaler and Spiriva inhaler. Has been using albuterol inhaler, feels like it helps briefly with the cough.     Has had intermittent hive-like rash on body and face.    Currently taking omeprazole 40 mg twice per day, as well as Tagamet, and her GERD symptoms are still poorly controlled.     OBJECTIVE     /78 (BP Location: Left arm, Patient Position: Sitting, BP Cuff Size: Adult)   Pulse 77   Temp 36.5 °C (97.7 °F) (Temporal)   Resp 14   Ht 1.6 m (5' 3\")   Wt 88 kg (194 lb)   SpO2 95%   BMI 34.37 kg/m²  Body mass index is 34.37 kg/m².    PHYSICAL EXAM  Constitutional: Sitting comfortably, in no acute distress, responds to questions appropriately.  Eyes:  No conjunctival injection, no scleral icterus, PERRL  Ears: Normal tympanic membranes, external ear canals clear  Neck: No cervical lymphadenopathy  Mouth: oral mucosa moist  Throat: Oropharyngeal cobblestoning present  Heart: Regular S1 S2, no murmurs, rub, or gallops  Lungs: Clear to auscultation bilaterally, no wheezes, rales, or rhonchi  Abdomen: mild LUQ and epigastric tenderness without rebound or guarding  Skin: Warm and dry, no rashes or lesions on face or exposed upper extremities    ASSESSMENT & PLAN  1. Gastroesophageal reflux disease, unspecified whether " esophagitis present  2. Chronic cough  3. Moderate persistent asthma without complication  Suspect her cough could be multifactorial, however her symptoms are suspicious for GERD being the primary contributor at this point.  She reports that she previously had good control of her cough with dexlansoprazole previously, though it was expensive.  We will switch her omeprazole to dexlansoprazole 60 mg daily.  Given her severe uncontrolled GERD and dysphagia we will refer to gastroenterology for EGD.  Also possible that her asthma is contributing, though exam today does not reveal any wheezes and she has good airflow bilaterally.  She reports that she has already scheduled an appointment with her allergist/asthma doctor.  I think less likely at this point is upper airway cough syndrome, could be contributing.  Chest x-ray today in clinic is normal.  - Referral to Gastroenterology  - DX-CHEST-2 VIEWS; Future

## 2023-03-20 DIAGNOSIS — E11.9 DIABETES MELLITUS TYPE 2, NONINSULIN DEPENDENT (HCC): ICD-10-CM

## 2023-03-20 DIAGNOSIS — E11.65 TYPE 2 DIABETES MELLITUS WITH HYPERGLYCEMIA, WITHOUT LONG-TERM CURRENT USE OF INSULIN (HCC): ICD-10-CM

## 2023-03-20 DIAGNOSIS — K21.9 GASTROESOPHAGEAL REFLUX DISEASE, UNSPECIFIED WHETHER ESOPHAGITIS PRESENT: ICD-10-CM

## 2023-03-21 ENCOUNTER — PATIENT MESSAGE (OUTPATIENT)
Dept: MEDICAL GROUP | Facility: PHYSICIAN GROUP | Age: 43
End: 2023-03-21
Payer: COMMERCIAL

## 2023-03-21 RX ORDER — OMEPRAZOLE 40 MG/1
40 CAPSULE, DELAYED RELEASE ORAL 2 TIMES DAILY
Qty: 180 CAPSULE | Refills: 3 | Status: SHIPPED | OUTPATIENT
Start: 2023-03-21 | End: 2023-03-22

## 2023-03-21 RX ORDER — METFORMIN HYDROCHLORIDE 500 MG/1
1000 TABLET, EXTENDED RELEASE ORAL 2 TIMES DAILY
Qty: 360 TABLET | Refills: 3 | Status: SHIPPED | OUTPATIENT
Start: 2023-03-21 | End: 2023-09-19 | Stop reason: SDUPTHER

## 2023-03-22 RX ORDER — DEXLANSOPRAZOLE 60 MG/1
60 CAPSULE, DELAYED RELEASE ORAL DAILY
Qty: 30 CAPSULE | Refills: 3 | Status: SHIPPED | OUTPATIENT
Start: 2023-03-22 | End: 2023-08-01

## 2023-03-30 ENCOUNTER — OFFICE VISIT (OUTPATIENT)
Dept: DERMATOLOGY | Facility: IMAGING CENTER | Age: 43
End: 2023-03-30
Payer: COMMERCIAL

## 2023-03-30 DIAGNOSIS — L30.9 DERMATITIS: ICD-10-CM

## 2023-03-30 DIAGNOSIS — L30.9 ECZEMA, UNSPECIFIED TYPE: ICD-10-CM

## 2023-03-30 DIAGNOSIS — L70.0 ACNE VULGARIS: ICD-10-CM

## 2023-03-30 PROCEDURE — 99214 OFFICE O/P EST MOD 30 MIN: CPT | Performed by: NURSE PRACTITIONER

## 2023-03-30 RX ORDER — AZELASTINE 1 MG/ML
SPRAY, METERED NASAL
COMMUNITY
Start: 2023-02-24

## 2023-03-30 RX ORDER — TRETINOIN 0.5 MG/G
CREAM TOPICAL
Qty: 45 G | Refills: 5 | Status: SHIPPED | OUTPATIENT
Start: 2023-03-30

## 2023-03-30 RX ORDER — CLOBETASOL PROPIONATE 0.5 MG/G
AEROSOL, FOAM TOPICAL
Qty: 100 G | Refills: 1 | Status: SHIPPED | OUTPATIENT
Start: 2023-03-30

## 2023-03-30 NOTE — PROGRESS NOTES
DERMATOLOGY NOTE  NEW VISIT       Chief complaint: Establish Care and Rash     HPI: eczema , itchy scalp   Onset:  child   Aggravating factors:  none    Alleviating factors:  clobetasol foam   New creams/topicals: none   New medications (up to last 6 months): none   New travel: no   Other exposures:  no   Treatments: no         History of skin cancer: No  History of precancers/actinic keratoses: No  History of biopsies:No  History of blistering/severe sunburns:No  Family history of skin cancer:Yes, Details: mother type unknown   Family history of atypical moles:No      Allergies   Allergen Reactions    Morphine      itchy        MEDICATIONS:  Medications relevant to specialty reviewed.     REVIEW OF SYSTEMS:   Positive for skin (see HPI)  Negative for fevers and chills       EXAM:  There were no vitals taken for this visit.  Constitutional: Well-developed, well-nourished, and in no distress.     A focused skin exam was performed including the affected areas of the head (including face). Notable findings on exam today listed below and/or in assessment/plan.   xerosis cutis throughout  few erythematous papules, zero pustules, few open and closed comedones concentrated on chest  No evidence of active eczema on body or scalp      IMPRESSION / PLAN:    1. Dermatitis on scalp, AD vs seb derm, chronic and stable  Requesting refills on Rx below  - Clobetasol Propionate Emulsion 0.05 % Foam; AAA daily for 2 weeks then use as needed  Dispense: 100 g; Refill: 1    2. Acne vulgaris  Discussed use of keratolytic, SA body wash  Requesting refills on Rx below  - tretinoin (RETIN-A) 0.05 % cream; Apply  topically every evening.  Dispense: 45 g; Refill: 5    3. Eczema, unspecified type, stable  Advised use of emollients, dry skin handouts given  Advised to increase zyrtec to 2 pills at bedtime  Follow up 6 weeks        Discussed risks, benefits, alternative treatments as well as common side effects associated with prescribed  treatment, Patient verbalized understanding and agrees with plan regarding the above          Please note that this dictation was created using voice recognition software. I have made every reasonable attempt to correct obvious errors, but I expect that there are errors of grammar and possibly content that I did not discover before finalizing the note.      Return to clinic in: Return in about 6 weeks (around 5/11/2023) for Rash follow up. and as needed for any new or changing skin lesions.

## 2023-07-23 ENCOUNTER — APPOINTMENT (OUTPATIENT)
Dept: RADIOLOGY | Facility: MEDICAL CENTER | Age: 43
End: 2023-07-23
Attending: EMERGENCY MEDICINE
Payer: COMMERCIAL

## 2023-07-23 ENCOUNTER — HOSPITAL ENCOUNTER (EMERGENCY)
Facility: MEDICAL CENTER | Age: 43
End: 2023-07-23
Attending: EMERGENCY MEDICINE
Payer: COMMERCIAL

## 2023-07-23 VITALS
TEMPERATURE: 98.1 F | SYSTOLIC BLOOD PRESSURE: 132 MMHG | DIASTOLIC BLOOD PRESSURE: 78 MMHG | HEART RATE: 71 BPM | RESPIRATION RATE: 18 BRPM | OXYGEN SATURATION: 97 % | WEIGHT: 200 LBS | BODY MASS INDEX: 35.44 KG/M2 | HEIGHT: 63 IN

## 2023-07-23 DIAGNOSIS — S16.1XXA STRAIN OF NECK MUSCLE, INITIAL ENCOUNTER: ICD-10-CM

## 2023-07-23 DIAGNOSIS — M25.572 ACUTE LEFT ANKLE PAIN: ICD-10-CM

## 2023-07-23 DIAGNOSIS — M25.562 ACUTE PAIN OF BOTH KNEES: ICD-10-CM

## 2023-07-23 DIAGNOSIS — M25.561 ACUTE PAIN OF BOTH KNEES: ICD-10-CM

## 2023-07-23 PROCEDURE — 99284 EMERGENCY DEPT VISIT MOD MDM: CPT

## 2023-07-23 PROCEDURE — 73600 X-RAY EXAM OF ANKLE: CPT | Mod: LT

## 2023-07-23 PROCEDURE — 700102 HCHG RX REV CODE 250 W/ 637 OVERRIDE(OP): Performed by: EMERGENCY MEDICINE

## 2023-07-23 PROCEDURE — 72040 X-RAY EXAM NECK SPINE 2-3 VW: CPT

## 2023-07-23 PROCEDURE — 307740 HCHG GREEN TRAUMA TEAM SERVICES

## 2023-07-23 PROCEDURE — 72070 X-RAY EXAM THORAC SPINE 2VWS: CPT

## 2023-07-23 PROCEDURE — A9270 NON-COVERED ITEM OR SERVICE: HCPCS | Performed by: EMERGENCY MEDICINE

## 2023-07-23 RX ORDER — ACETAMINOPHEN 325 MG/1
650 TABLET ORAL ONCE
Status: COMPLETED | OUTPATIENT
Start: 2023-07-23 | End: 2023-07-23

## 2023-07-23 RX ORDER — CYCLOBENZAPRINE HCL 10 MG
10 TABLET ORAL 3 TIMES DAILY PRN
Qty: 15 TABLET | Refills: 0 | Status: SHIPPED | OUTPATIENT
Start: 2023-07-23 | End: 2023-08-01

## 2023-07-23 RX ADMIN — ACETAMINOPHEN 650 MG: 325 TABLET, FILM COATED ORAL at 11:15

## 2023-07-23 ASSESSMENT — FIBROSIS 4 INDEX
FIB4 SCORE: 0.47
FIB4 SCORE: 0.47
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
## 2023-08-01 ENCOUNTER — OFFICE VISIT (OUTPATIENT)
Dept: MEDICAL GROUP | Facility: PHYSICIAN GROUP | Age: 43
End: 2023-08-01
Payer: COMMERCIAL

## 2023-08-01 VITALS
DIASTOLIC BLOOD PRESSURE: 68 MMHG | SYSTOLIC BLOOD PRESSURE: 114 MMHG | OXYGEN SATURATION: 97 % | WEIGHT: 193 LBS | HEIGHT: 63 IN | HEART RATE: 87 BPM | BODY MASS INDEX: 34.2 KG/M2 | TEMPERATURE: 98.1 F

## 2023-08-01 DIAGNOSIS — G44.209 TENSION HEADACHE: ICD-10-CM

## 2023-08-01 DIAGNOSIS — S16.1XXD STRAIN OF NECK MUSCLE, SUBSEQUENT ENCOUNTER: ICD-10-CM

## 2023-08-01 DIAGNOSIS — G89.29 CHRONIC BILATERAL LOW BACK PAIN WITH RIGHT-SIDED SCIATICA: ICD-10-CM

## 2023-08-01 DIAGNOSIS — M54.41 CHRONIC BILATERAL LOW BACK PAIN WITH RIGHT-SIDED SCIATICA: ICD-10-CM

## 2023-08-01 PROBLEM — M54.42 CHRONIC BILATERAL LOW BACK PAIN WITH LEFT-SIDED SCIATICA: Status: ACTIVE | Noted: 2023-08-01

## 2023-08-01 PROCEDURE — 3074F SYST BP LT 130 MM HG: CPT | Performed by: STUDENT IN AN ORGANIZED HEALTH CARE EDUCATION/TRAINING PROGRAM

## 2023-08-01 PROCEDURE — 99214 OFFICE O/P EST MOD 30 MIN: CPT | Performed by: STUDENT IN AN ORGANIZED HEALTH CARE EDUCATION/TRAINING PROGRAM

## 2023-08-01 PROCEDURE — 3078F DIAST BP <80 MM HG: CPT | Performed by: STUDENT IN AN ORGANIZED HEALTH CARE EDUCATION/TRAINING PROGRAM

## 2023-08-01 RX ORDER — TIZANIDINE HYDROCHLORIDE 2 MG/1
2-4 CAPSULE, GELATIN COATED ORAL 3 TIMES DAILY PRN
Qty: 90 CAPSULE | Refills: 0 | Status: SHIPPED | OUTPATIENT
Start: 2023-08-01 | End: 2023-08-28

## 2023-08-01 RX ORDER — RABEPRAZOLE SODIUM 20 MG/1
20 TABLET, DELAYED RELEASE ORAL DAILY
COMMUNITY
Start: 2023-07-19

## 2023-08-01 ASSESSMENT — ENCOUNTER SYMPTOMS
BLURRED VISION: 0
VOMITING: 0
FOCAL WEAKNESS: 0
HEADACHES: 1
ABDOMINAL PAIN: 0
FEVER: 0
NECK PAIN: 1
NAUSEA: 1
TINGLING: 0
DOUBLE VISION: 0
DIZZINESS: 0
SHORTNESS OF BREATH: 0
LOSS OF CONSCIOUSNESS: 0
COUGH: 0
HEARTBURN: 1
WEAKNESS: 0
SENSORY CHANGE: 0
CHILLS: 0
BACK PAIN: 1

## 2023-08-01 ASSESSMENT — FIBROSIS 4 INDEX: FIB4 SCORE: 0.47
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
## 2023-08-08 ENCOUNTER — OFFICE VISIT (OUTPATIENT)
Dept: MEDICAL GROUP | Facility: PHYSICIAN GROUP | Age: 43
End: 2023-08-08
Payer: COMMERCIAL

## 2023-08-08 VITALS
DIASTOLIC BLOOD PRESSURE: 60 MMHG | WEIGHT: 194 LBS | SYSTOLIC BLOOD PRESSURE: 90 MMHG | HEIGHT: 63 IN | RESPIRATION RATE: 20 BRPM | OXYGEN SATURATION: 95 % | TEMPERATURE: 97.1 F | BODY MASS INDEX: 34.38 KG/M2 | HEART RATE: 76 BPM

## 2023-08-08 DIAGNOSIS — M54.41 CHRONIC BILATERAL LOW BACK PAIN WITH RIGHT-SIDED SCIATICA: ICD-10-CM

## 2023-08-08 DIAGNOSIS — G89.29 CHRONIC BILATERAL LOW BACK PAIN WITH RIGHT-SIDED SCIATICA: ICD-10-CM

## 2023-08-08 DIAGNOSIS — R51.9 ACUTE NONINTRACTABLE HEADACHE, UNSPECIFIED HEADACHE TYPE: ICD-10-CM

## 2023-08-08 DIAGNOSIS — S16.1XXD STRAIN OF MUSCLE, FASCIA AND TENDON AT NECK LEVEL, SUBSEQUENT ENCOUNTER: ICD-10-CM

## 2023-08-08 PROCEDURE — 99214 OFFICE O/P EST MOD 30 MIN: CPT | Performed by: STUDENT IN AN ORGANIZED HEALTH CARE EDUCATION/TRAINING PROGRAM

## 2023-08-08 PROCEDURE — 3078F DIAST BP <80 MM HG: CPT | Performed by: STUDENT IN AN ORGANIZED HEALTH CARE EDUCATION/TRAINING PROGRAM

## 2023-08-08 PROCEDURE — 3074F SYST BP LT 130 MM HG: CPT | Performed by: STUDENT IN AN ORGANIZED HEALTH CARE EDUCATION/TRAINING PROGRAM

## 2023-08-08 RX ORDER — OXCARBAZEPINE 300 MG/1
300-600 TABLET, FILM COATED ORAL 2 TIMES DAILY
COMMUNITY
Start: 2023-08-05

## 2023-08-08 ASSESSMENT — ENCOUNTER SYMPTOMS
BLURRED VISION: 0
PHOTOPHOBIA: 1
TINGLING: 0
BACK PAIN: 1
PALPITATIONS: 0
CHILLS: 0
NAUSEA: 0
ABDOMINAL PAIN: 0
HEADACHES: 1
SHORTNESS OF BREATH: 0
WEAKNESS: 0
COUGH: 0
FOCAL WEAKNESS: 0
WEIGHT LOSS: 0
NECK PAIN: 1
FEVER: 0
SENSORY CHANGE: 0
VOMITING: 0
SPEECH CHANGE: 0

## 2023-08-08 ASSESSMENT — FIBROSIS 4 INDEX: FIB4 SCORE: 0.47
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
## 2023-08-10 ENCOUNTER — OFFICE VISIT (OUTPATIENT)
Dept: MEDICAL GROUP | Facility: PHYSICIAN GROUP | Age: 43
End: 2023-08-10
Payer: COMMERCIAL

## 2023-08-10 VITALS
RESPIRATION RATE: 20 BRPM | HEIGHT: 63 IN | SYSTOLIC BLOOD PRESSURE: 100 MMHG | HEART RATE: 78 BPM | BODY MASS INDEX: 33.66 KG/M2 | DIASTOLIC BLOOD PRESSURE: 62 MMHG | TEMPERATURE: 97.3 F | WEIGHT: 190 LBS | OXYGEN SATURATION: 96 %

## 2023-08-10 DIAGNOSIS — M62.838 MUSCLE SPASMS OF NECK: ICD-10-CM

## 2023-08-10 DIAGNOSIS — M62.830 MUSCLE SPASM OF BACK: ICD-10-CM

## 2023-08-10 PROCEDURE — 3074F SYST BP LT 130 MM HG: CPT | Performed by: STUDENT IN AN ORGANIZED HEALTH CARE EDUCATION/TRAINING PROGRAM

## 2023-08-10 PROCEDURE — 20553 NJX 1/MLT TRIGGER POINTS 3/>: CPT | Performed by: STUDENT IN AN ORGANIZED HEALTH CARE EDUCATION/TRAINING PROGRAM

## 2023-08-10 PROCEDURE — 3078F DIAST BP <80 MM HG: CPT | Performed by: STUDENT IN AN ORGANIZED HEALTH CARE EDUCATION/TRAINING PROGRAM

## 2023-08-10 ASSESSMENT — FIBROSIS 4 INDEX: FIB4 SCORE: 0.47
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
## 2023-08-10 NOTE — PROCEDURES
Procedure Note:  Informed consent obtained, RBA discussed. Vital signs reviewed. Medications reviewed and reconciled. No barriers to learning noted. Time out performed.  Pt. positioned comfortably in seated position for neck/trapezius injections and prone for mid/low back injections. Tender points marked with end of Chloraprep swab. Prepped tender points with chloraprep. A 25g 1 inch and 27g 1/2 inch needle was inserted into the tender points of the mid/lower back and neck. 1-2ml of solution was injected at each location. Pt. tolerated procedure well. Discharged from the clinic with decrease in pain from 5/10 to 0/10.     Location: Right medial middle trapezius, right central middle trapezius, right suboccipital/base of skull (used 27g needle), right lower back (medial paraspinal), right mid back (medial paraspinal)    # Injections: 5    Injectate solution: 8ml (total) 1% lidocaine     Recommend follow-up in 1-2 weeks for repeat injection therapy if appropriate. After care and return precautions discussed.

## 2023-08-11 ENCOUNTER — PATIENT MESSAGE (OUTPATIENT)
Dept: MEDICAL GROUP | Facility: PHYSICIAN GROUP | Age: 43
End: 2023-08-11
Payer: COMMERCIAL

## 2023-08-11 DIAGNOSIS — G89.29 CHRONIC BILATERAL LOW BACK PAIN WITH RIGHT-SIDED SCIATICA: ICD-10-CM

## 2023-08-11 DIAGNOSIS — M54.41 CHRONIC BILATERAL LOW BACK PAIN WITH RIGHT-SIDED SCIATICA: ICD-10-CM

## 2023-08-11 DIAGNOSIS — M62.830 MUSCLE SPASM OF BACK: ICD-10-CM

## 2023-08-11 DIAGNOSIS — M62.838 MUSCLE SPASMS OF NECK: ICD-10-CM

## 2023-08-16 RX ORDER — DROSPIRENONE 4 MG/1
1 TABLET, FILM COATED ORAL DAILY
Qty: 28 TABLET | Refills: 0 | Status: SHIPPED | OUTPATIENT
Start: 2023-08-16 | End: 2023-08-18 | Stop reason: SDUPTHER

## 2023-08-17 ENCOUNTER — OFFICE VISIT (OUTPATIENT)
Dept: MEDICAL GROUP | Facility: PHYSICIAN GROUP | Age: 43
End: 2023-08-17
Payer: COMMERCIAL

## 2023-08-17 VITALS
WEIGHT: 191 LBS | HEIGHT: 63 IN | HEART RATE: 88 BPM | DIASTOLIC BLOOD PRESSURE: 70 MMHG | RESPIRATION RATE: 14 BRPM | BODY MASS INDEX: 33.84 KG/M2 | SYSTOLIC BLOOD PRESSURE: 124 MMHG | TEMPERATURE: 97.7 F | OXYGEN SATURATION: 97 %

## 2023-08-17 DIAGNOSIS — E66.9 OBESITY (BMI 30-39.9): ICD-10-CM

## 2023-08-17 DIAGNOSIS — E11.65 TYPE 2 DIABETES MELLITUS WITH HYPERGLYCEMIA, WITHOUT LONG-TERM CURRENT USE OF INSULIN (HCC): ICD-10-CM

## 2023-08-17 DIAGNOSIS — G89.29 CHRONIC BILATERAL LOW BACK PAIN WITH RIGHT-SIDED SCIATICA: ICD-10-CM

## 2023-08-17 DIAGNOSIS — M54.41 CHRONIC BILATERAL LOW BACK PAIN WITH RIGHT-SIDED SCIATICA: ICD-10-CM

## 2023-08-17 LAB
HBA1C MFR BLD: 6.8 % (ref ?–5.8)
POCT INT CON NEG: NEGATIVE
POCT INT CON POS: POSITIVE

## 2023-08-17 PROCEDURE — 3078F DIAST BP <80 MM HG: CPT | Performed by: FAMILY MEDICINE

## 2023-08-17 PROCEDURE — 99214 OFFICE O/P EST MOD 30 MIN: CPT | Performed by: FAMILY MEDICINE

## 2023-08-17 PROCEDURE — 83036 HEMOGLOBIN GLYCOSYLATED A1C: CPT | Performed by: FAMILY MEDICINE

## 2023-08-17 PROCEDURE — 3074F SYST BP LT 130 MM HG: CPT | Performed by: FAMILY MEDICINE

## 2023-08-17 ASSESSMENT — FIBROSIS 4 INDEX: FIB4 SCORE: 0.47
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
## 2023-08-18 NOTE — TELEPHONE ENCOUNTER
Patient called in wanting a refill on her birth control Slynd 4 mg. She would also like the medicine to be refilled for three months. She has an Annual Scheduled. I have confirmed her pharmacy which is [facility] off of [address].

## 2023-08-25 DIAGNOSIS — G89.29 CHRONIC BILATERAL LOW BACK PAIN WITH RIGHT-SIDED SCIATICA: ICD-10-CM

## 2023-08-25 DIAGNOSIS — S16.1XXD STRAIN OF NECK MUSCLE, SUBSEQUENT ENCOUNTER: ICD-10-CM

## 2023-08-25 DIAGNOSIS — M54.41 CHRONIC BILATERAL LOW BACK PAIN WITH RIGHT-SIDED SCIATICA: ICD-10-CM

## 2023-08-28 RX ORDER — TIZANIDINE HYDROCHLORIDE 2 MG/1
CAPSULE, GELATIN COATED ORAL
Qty: 90 CAPSULE | Refills: 0 | Status: SHIPPED | OUTPATIENT
Start: 2023-08-28

## 2023-09-01 RX ORDER — DROSPIRENONE 4 MG/1
1 TABLET, FILM COATED ORAL DAILY
Qty: 28 TABLET | Refills: 0 | Status: SHIPPED | OUTPATIENT
Start: 2023-09-01 | End: 2023-09-22 | Stop reason: SDUPTHER

## 2023-09-19 ENCOUNTER — PATIENT MESSAGE (OUTPATIENT)
Dept: MEDICAL GROUP | Facility: PHYSICIAN GROUP | Age: 43
End: 2023-09-19
Payer: COMMERCIAL

## 2023-09-19 DIAGNOSIS — E11.9 DIABETES MELLITUS TYPE 2, NONINSULIN DEPENDENT (HCC): ICD-10-CM

## 2023-09-19 DIAGNOSIS — E11.65 TYPE 2 DIABETES MELLITUS WITH HYPERGLYCEMIA, WITHOUT LONG-TERM CURRENT USE OF INSULIN (HCC): ICD-10-CM

## 2023-09-19 DIAGNOSIS — E78.5 DYSLIPIDEMIA: ICD-10-CM

## 2023-09-20 RX ORDER — ATORVASTATIN CALCIUM 20 MG/1
20 TABLET, FILM COATED ORAL NIGHTLY
Qty: 90 TABLET | Refills: 3 | Status: SHIPPED | OUTPATIENT
Start: 2023-09-20

## 2023-09-20 RX ORDER — METFORMIN HYDROCHLORIDE 500 MG/1
1000 TABLET, EXTENDED RELEASE ORAL 2 TIMES DAILY
Qty: 360 TABLET | Refills: 3 | Status: SHIPPED | OUTPATIENT
Start: 2023-09-20

## 2023-09-22 DIAGNOSIS — Z76.0 REPEAT PRESCRIPTION ISSUE: ICD-10-CM

## 2023-09-22 RX ORDER — DROSPIRENONE 4 MG/1
1 TABLET, FILM COATED ORAL DAILY
Qty: 84 TABLET | Refills: 4 | Status: SHIPPED | OUTPATIENT
Start: 2023-09-22 | End: 2023-10-20

## 2023-09-26 ENCOUNTER — APPOINTMENT (OUTPATIENT)
Dept: OBGYN | Facility: CLINIC | Age: 43
End: 2023-09-26
Payer: COMMERCIAL

## 2023-10-13 ENCOUNTER — OFFICE VISIT (OUTPATIENT)
Dept: URGENT CARE | Facility: PHYSICIAN GROUP | Age: 43
End: 2023-10-13
Payer: COMMERCIAL

## 2023-10-13 VITALS
RESPIRATION RATE: 18 BRPM | BODY MASS INDEX: 35.08 KG/M2 | HEIGHT: 63 IN | HEART RATE: 100 BPM | SYSTOLIC BLOOD PRESSURE: 122 MMHG | WEIGHT: 198 LBS | TEMPERATURE: 98.7 F | DIASTOLIC BLOOD PRESSURE: 70 MMHG | OXYGEN SATURATION: 97 %

## 2023-10-13 DIAGNOSIS — R21 RASH OF GROIN: ICD-10-CM

## 2023-10-13 PROCEDURE — 3074F SYST BP LT 130 MM HG: CPT

## 2023-10-13 PROCEDURE — 99214 OFFICE O/P EST MOD 30 MIN: CPT

## 2023-10-13 PROCEDURE — 3078F DIAST BP <80 MM HG: CPT

## 2023-10-13 RX ORDER — KETOCONAZOLE 20 MG/G
1 CREAM TOPICAL
Qty: 30 G | Refills: 0 | Status: SHIPPED | OUTPATIENT
Start: 2023-10-13

## 2023-10-13 ASSESSMENT — FIBROSIS 4 INDEX: FIB4 SCORE: 0.47

## 2023-10-13 NOTE — PROGRESS NOTES
"Chief Complaint   Patient presents with    Rash     Below her abdomen. Pt states it's painful and itchy          Subjective:   HISTORY OF PRESENT ILLNESS: Sherry Hatfield is a 43 y.o. female who presents for rash to her bilateral groin.  She has a long history of rashes and skin problems.  She was seeing a dermatologist in [place] that diagnosed her with carp syndrome and she was doing a daily regimen of lactic acid treatment to the areas of affected skin.  She reports that she is seeing a new dermatologist in [place] who prescribed her Aceta Siliq acid and she noticed that her rash did return.  She has since started to use a lactic acid lotion again but is noticing that her rash to her groin is not improving as it normally does.  Patient denies fever, chills.  No drainage from her rash.      Medications, Allergies, current problem list, Social and Family history reviewed today in Epic.     Objective:     /70 (BP Location: Right arm, Patient Position: Sitting, BP Cuff Size: Adult)   Pulse 100   Temp 37.1 °C (98.7 °F) (Temporal)   Resp 18   Ht 1.6 m (5' 3\")   Wt 89.8 kg (198 lb)   SpO2 97%     Physical Exam  Vitals reviewed.   Constitutional:       Appearance: Normal appearance.   HENT:      Mouth/Throat:      Mouth: Mucous membranes are moist.   Cardiovascular:      Rate and Rhythm: Normal rate.   Pulmonary:      Effort: Pulmonary effort is normal.   Skin:     General: Skin is warm and dry.      Comments: Small pin point singular vesicles noted to bilateral groin region without surrounding erythema.     Neurological:      Mental Status: She is alert and oriented to person, place, and time.   Psychiatric:         Mood and Affect: Mood normal.         Assessment/Plan:     Diagnosis and associated orders    I personally reviewed prior external notes and test results pertinent to today's visit.     1. Rash of groin  ketoconazole (NIZORAL) 2 % Cream    Referral to Dermatology            IMPRESSION: " Patient is non-toxic appearing and suitable for outpatient care at this time.   Exam findings reassuring with stable vital signs, No red flag symptoms or exam findings.  The etiology of her rash is unclear at this time.  Due to location of the rash I have advised her to trial an antifungal at this time.  I have referred her to dermatology as she is looking for a new dermatologist.  She is going to continue the lactic acid washes as this has helped her in the past.  She should return for any worsening symptoms.    Differential diagnosis discussed. Pt was Educated on red flag symptoms. Pt has been Instructed to return to Urgent Care or nearest Emergency Department if symptoms fail to improve, for any change in condition, further concerns, or new concerning symptoms. Patient states understanding of the plan of care and discharge instructions.  They are discharged in stable condition.     Between 30-39 minutes was spent caring for this patient on the day of the encounter which included face-to-face time, discussing the diagnosis, medical management, follow-up, emergency room precautions and completion of the chart. This does not include time spent on separately billable procedures/tests.     Please note that this dictation was created using voice recognition software. I have made a reasonable attempt to correct obvious errors, but I expect that there are errors of grammar and possibly content that I did not discover before finalizing the note.    This note was electronically signed by JASPER Veronica

## 2023-10-20 ENCOUNTER — HOSPITAL ENCOUNTER (OUTPATIENT)
Dept: LAB | Facility: MEDICAL CENTER | Age: 43
End: 2023-10-20
Attending: FAMILY MEDICINE
Payer: COMMERCIAL

## 2023-10-20 ENCOUNTER — OFFICE VISIT (OUTPATIENT)
Dept: MEDICAL GROUP | Facility: PHYSICIAN GROUP | Age: 43
End: 2023-10-20
Payer: COMMERCIAL

## 2023-10-20 VITALS
TEMPERATURE: 97.7 F | HEART RATE: 92 BPM | SYSTOLIC BLOOD PRESSURE: 124 MMHG | RESPIRATION RATE: 14 BRPM | HEIGHT: 63 IN | WEIGHT: 196 LBS | BODY MASS INDEX: 34.73 KG/M2 | OXYGEN SATURATION: 98 % | DIASTOLIC BLOOD PRESSURE: 80 MMHG

## 2023-10-20 DIAGNOSIS — R41.3 MEMORY DISTURBANCE: ICD-10-CM

## 2023-10-20 DIAGNOSIS — R23.2 HOT FLASHES: ICD-10-CM

## 2023-10-20 DIAGNOSIS — R41.840 ATTENTION AND CONCENTRATION DEFICIT: ICD-10-CM

## 2023-10-20 DIAGNOSIS — E22.1 HYPERPROLACTINEMIA (HCC): ICD-10-CM

## 2023-10-20 DIAGNOSIS — R53.82 CHRONIC FATIGUE: ICD-10-CM

## 2023-10-20 DIAGNOSIS — Z23 NEED FOR VACCINATION: ICD-10-CM

## 2023-10-20 DIAGNOSIS — F31.9 BIPOLAR 1 DISORDER (HCC): ICD-10-CM

## 2023-10-20 LAB
ALBUMIN SERPL BCP-MCNC: 4.9 G/DL (ref 3.2–4.9)
ALBUMIN/GLOB SERPL: 1.8 G/DL
ALP SERPL-CCNC: 80 U/L (ref 30–99)
ALT SERPL-CCNC: 95 U/L (ref 2–50)
ANION GAP SERPL CALC-SCNC: 13 MMOL/L (ref 7–16)
AST SERPL-CCNC: 104 U/L (ref 12–45)
BASOPHILS # BLD AUTO: 0.6 % (ref 0–1.8)
BASOPHILS # BLD: 0.06 K/UL (ref 0–0.12)
BILIRUB SERPL-MCNC: 0.4 MG/DL (ref 0.1–1.5)
BUN SERPL-MCNC: 10 MG/DL (ref 8–22)
CALCIUM ALBUM COR SERPL-MCNC: 9 MG/DL (ref 8.5–10.5)
CALCIUM SERPL-MCNC: 9.7 MG/DL (ref 8.5–10.5)
CHLORIDE SERPL-SCNC: 104 MMOL/L (ref 96–112)
CHOLEST SERPL-MCNC: 143 MG/DL (ref 100–199)
CO2 SERPL-SCNC: 23 MMOL/L (ref 20–33)
CREAT SERPL-MCNC: 0.6 MG/DL (ref 0.5–1.4)
EOSINOPHIL # BLD AUTO: 0.3 K/UL (ref 0–0.51)
EOSINOPHIL NFR BLD: 3.1 % (ref 0–6.9)
ERYTHROCYTE [DISTWIDTH] IN BLOOD BY AUTOMATED COUNT: 41.3 FL (ref 35.9–50)
FASTING STATUS PATIENT QL REPORTED: NORMAL
FOLATE SERPL-MCNC: 16.6 NG/ML
FSH SERPL-ACNC: 10 MIU/ML
GFR SERPLBLD CREATININE-BSD FMLA CKD-EPI: 114 ML/MIN/1.73 M 2
GLOBULIN SER CALC-MCNC: 2.8 G/DL (ref 1.9–3.5)
GLUCOSE SERPL-MCNC: 169 MG/DL (ref 65–99)
HCT VFR BLD AUTO: 41.1 % (ref 37–47)
HDLC SERPL-MCNC: 44 MG/DL
HGB BLD-MCNC: 13.4 G/DL (ref 12–16)
IMM GRANULOCYTES # BLD AUTO: 0.12 K/UL (ref 0–0.11)
IMM GRANULOCYTES NFR BLD AUTO: 1.2 % (ref 0–0.9)
LDLC SERPL CALC-MCNC: 74 MG/DL
LH SERPL-ACNC: 7.4 IU/L
LYMPHOCYTES # BLD AUTO: 3.9 K/UL (ref 1–4.8)
LYMPHOCYTES NFR BLD: 39.8 % (ref 22–41)
MCH RBC QN AUTO: 28.5 PG (ref 27–33)
MCHC RBC AUTO-ENTMCNC: 32.6 G/DL (ref 32.2–35.5)
MCV RBC AUTO: 87.4 FL (ref 81.4–97.8)
MONOCYTES # BLD AUTO: 0.6 K/UL (ref 0–0.85)
MONOCYTES NFR BLD AUTO: 6.1 % (ref 0–13.4)
NEUTROPHILS # BLD AUTO: 4.82 K/UL (ref 1.82–7.42)
NEUTROPHILS NFR BLD: 49.2 % (ref 44–72)
NRBC # BLD AUTO: 0 K/UL
NRBC BLD-RTO: 0 /100 WBC (ref 0–0.2)
PLATELET # BLD AUTO: 416 K/UL (ref 164–446)
PMV BLD AUTO: 9.8 FL (ref 9–12.9)
POTASSIUM SERPL-SCNC: 4.3 MMOL/L (ref 3.6–5.5)
PROLACTIN SERPL-MCNC: 9.19 NG/ML (ref 2.8–26)
PROT SERPL-MCNC: 7.7 G/DL (ref 6–8.2)
RBC # BLD AUTO: 4.7 M/UL (ref 4.2–5.4)
SODIUM SERPL-SCNC: 140 MMOL/L (ref 135–145)
TRIGL SERPL-MCNC: 126 MG/DL (ref 0–149)
TSH SERPL DL<=0.005 MIU/L-ACNC: 1.67 UIU/ML (ref 0.38–5.33)
VIT B12 SERPL-MCNC: 918 PG/ML (ref 211–911)
WBC # BLD AUTO: 9.8 K/UL (ref 4.8–10.8)

## 2023-10-20 PROCEDURE — 3074F SYST BP LT 130 MM HG: CPT | Performed by: FAMILY MEDICINE

## 2023-10-20 PROCEDURE — 36415 COLL VENOUS BLD VENIPUNCTURE: CPT

## 2023-10-20 PROCEDURE — 80061 LIPID PANEL: CPT

## 2023-10-20 PROCEDURE — 90686 IIV4 VACC NO PRSV 0.5 ML IM: CPT | Performed by: FAMILY MEDICINE

## 2023-10-20 PROCEDURE — 84146 ASSAY OF PROLACTIN: CPT

## 2023-10-20 PROCEDURE — 82671 ASSAY OF ESTROGENS: CPT

## 2023-10-20 PROCEDURE — 82746 ASSAY OF FOLIC ACID SERUM: CPT

## 2023-10-20 PROCEDURE — 3079F DIAST BP 80-89 MM HG: CPT | Performed by: FAMILY MEDICINE

## 2023-10-20 PROCEDURE — 84443 ASSAY THYROID STIM HORMONE: CPT

## 2023-10-20 PROCEDURE — 85025 COMPLETE CBC W/AUTO DIFF WBC: CPT

## 2023-10-20 PROCEDURE — 83001 ASSAY OF GONADOTROPIN (FSH): CPT

## 2023-10-20 PROCEDURE — 83002 ASSAY OF GONADOTROPIN (LH): CPT

## 2023-10-20 PROCEDURE — 90471 IMMUNIZATION ADMIN: CPT | Performed by: FAMILY MEDICINE

## 2023-10-20 PROCEDURE — 82607 VITAMIN B-12: CPT

## 2023-10-20 PROCEDURE — 80053 COMPREHEN METABOLIC PANEL: CPT

## 2023-10-20 PROCEDURE — 99214 OFFICE O/P EST MOD 30 MIN: CPT | Mod: 25 | Performed by: FAMILY MEDICINE

## 2023-10-20 ASSESSMENT — FIBROSIS 4 INDEX: FIB4 SCORE: 0.47

## 2023-10-20 NOTE — PROGRESS NOTES
"CHIEF COMPLAINT / REASON FOR VISIT  Sherry Hatfield with history of type 2 diabetes mellitus and bipolar disorder is a 43 y.o. female that presents today for memory issues    HISTORY OF PRESENT ILLNESS  Worsening over 2 to 3 years, frequent forgetfulness, difficulties with memory. Reports feeling irritable and on-edge all the time. Constant anxiety. Psychiatrist has increased medication but this hasn't helped. Currently taking seroquel 225 mg QHS, Wellbutrin 300 mg, oxcarbazepine 300 mg in am and 600 mg in pm.  Reports starting to get hot flashes. She is concerned that she is going into perimenopause. Menstrual periods once every 3-4 months, heavy and crampy  Endorses frequent mind-wandering and decreased attention.     For work, is a . She is concerned that she may lose her job because of this.     She is very wary of taking habit forming medications    OBJECTIVE     /80 (BP Location: Right arm, Patient Position: Sitting, BP Cuff Size: Adult)   Pulse 92   Temp 36.5 °C (97.7 °F) (Temporal)   Resp 14   Ht 1.6 m (5' 3\")   Wt 88.9 kg (196 lb)   SpO2 98%   BMI 34.72 kg/m²      PHYSICAL EXAM  Constitutional: Sitting comfortably, in no acute distress, responds to questions appropriately.  Skin: Warm and dry, no rashes or lesions on face or exposed upper extremities    ASSESSMENT & PLAN  1. Memory disturbance  2. Attention and concentration deficit  3. Chronic fatigue  4. Hot flashes  5. Hyperprolactinemia (HCC)  6. Bipolar 1 disorder (HCC)  Unsure of etiology. She does have hot flashes which may be due to perimenopause so we will obtain labs for this. Treatment with hormone replacement could potentially be beneficial for her mood/attention, though the indicated diagnosis would be for hot flashes. It's also possible that her memory/focus issues are possibly due to uncontrolled anxiety or underlying attention deficit disorder. Does have diagnosed bipolar disorder for which she follows " with psychiatry, and I recommended that she follow up with her psychiatrist regarding her memory concerns as well.  - VIT B12,  FOLIC ACID  - TSH WITH REFLEX TO FT4; Future  - Lipid Profile; Future  - CBC WITH DIFFERENTIAL; Future  - VIT B12,  FOLIC ACID  - Comp Metabolic Panel; Future  - FSH/LH; Future  - ESTROGENS FRACTIONATED; Future  - PROLACTIN; Future    7. Need for vaccination  - INFLUENZA VACCINE QUAD INJ (PF)

## 2023-10-24 ENCOUNTER — PATIENT MESSAGE (OUTPATIENT)
Dept: MEDICAL GROUP | Facility: PHYSICIAN GROUP | Age: 43
End: 2023-10-24
Payer: COMMERCIAL

## 2023-10-24 LAB
ESTRADIOL SERPL HS-MCNC: 24.3 PG/ML
ESTROGEN SERPL CALC-MCNC: 37.5 PG/ML
ESTRONE SERPL-MCNC: 13.2 PG/ML

## 2023-10-30 ENCOUNTER — TELEMEDICINE (OUTPATIENT)
Dept: MEDICAL GROUP | Facility: PHYSICIAN GROUP | Age: 43
End: 2023-10-30
Payer: COMMERCIAL

## 2023-10-30 VITALS — HEIGHT: 63 IN | WEIGHT: 193 LBS | BODY MASS INDEX: 34.2 KG/M2

## 2023-10-30 DIAGNOSIS — R74.01 ELEVATED TRANSAMINASE LEVEL: ICD-10-CM

## 2023-10-30 DIAGNOSIS — E11.65 TYPE 2 DIABETES MELLITUS WITH HYPERGLYCEMIA, WITHOUT LONG-TERM CURRENT USE OF INSULIN (HCC): ICD-10-CM

## 2023-10-30 PROCEDURE — 99214 OFFICE O/P EST MOD 30 MIN: CPT | Mod: 95 | Performed by: FAMILY MEDICINE

## 2023-10-30 RX ORDER — SEMAGLUTIDE 0.68 MG/ML
0.25 INJECTION, SOLUTION SUBCUTANEOUS
Qty: 3 ML | Refills: 11 | Status: SHIPPED | OUTPATIENT
Start: 2023-10-30 | End: 2023-11-07 | Stop reason: SDUPTHER

## 2023-10-30 ASSESSMENT — FIBROSIS 4 INDEX: FIB4 SCORE: 1.1

## 2023-10-30 NOTE — PROGRESS NOTES
Virtual Visit: Established Patient   This visit was conducted via Zoom using secure and encrypted videoconferencing technology.   The patient was in their home in the [facility].    The patient's identity was confirmed and verbal consent was obtained for this virtual visit.    Subjective:   CC:   Chief Complaint   Patient presents with    Medication Problem     WANTS TO GET ON JARDIANCE     Lab Results     Sherry Hatfield is a 43 y.o. female presenting for evaluation and management of:    Talked to psychiatrist about memory issues, decreased trileptal dose   lost weight with Jardiance and she is wondering about weight loss medication  Has IBS with diarrhea so wants to avoid orlistat  Wants to avoid stimulants due to fear of steph with her bipolar disorder    Current medicines (including changes today)  Current Outpatient Medications   Medication Sig Dispense Refill    ketoconazole (NIZORAL) 2 % Cream Apply 1 Application topically 1 time a day as needed (Rash). 30 g 0    metFORMIN ER (GLUCOPHAGE XR) 500 MG TABLET SR 24 HR Take 2 Tablets by mouth 2 times a day. 360 Tablet 3    atorvastatin (LIPITOR) 20 MG Tab Take 1 Tablet by mouth every evening. 90 Tablet 3    tizanidine (ZANAFLEX) 2 MG capsule TAKE 1 TO 2 CAPSULES BY MOUTH THREE TIMES DAILY AS NEEDED FOR SPASM 90 Capsule 0    diclofenac sodium (VOLTAREN) 1 % Gel Apply 4 g topically 4 times a day as needed (back/neck pain). 150 g 3    OXcarbazepine (TRILEPTAL) 300 MG Tab Take 300-600 mg by mouth 2 times a day. Taking 300mg in the AM and 600mg at night      rabeprazole (ACIPHEX) 20 MG tablet Take 20 mg by mouth every day.      azelastine (ASTELIN) 137 MCG/SPRAY nasal spray USE 1 SPRAY(S) IN EACH NOSTRIL TWICE DAILY, USE WITH FLUNISOLIDE NASLA SPRAY      Clobetasol Propionate Emulsion 0.05 % Foam AAA daily for 2 weeks then use as needed 100 g 1    tretinoin (RETIN-A) 0.05 % cream Apply  topically every evening. 45 g 5    naratriptan (AMERGE) 2.5 MG  "tablet Take 1 Tablet by mouth one time as needed for Migraine for up to 1 dose. 9 Tablet 5    propranolol (INDERAL) 20 MG Tab TAKE 2 TABLETS BY MOUTH  TWICE DAILY 360 Tablet 3    spironolactone (ALDACTONE) 25 MG Tab TAKE 1 TABLET BY MOUTH  TWICE DAILY 180 Tablet 3    buPROPion (WELLBUTRIN XL) 300 MG XL tablet       hydrOXYzine HCl (ATARAX) 25 MG Tab       ondansetron (ZOFRAN) 4 MG Tab tablet TAKE 1 TABLET BY MOUTH EVERY 6 HOURS AS NEEDED FOR NAUSEA AND VOMITING FOR UP TO 7 DAYS      ADVAIR -21 MCG/ACT inhaler       SPIRIVA RESPIMAT 1.25 MCG/ACT Aero Soln       clobetasol (OLUX) 0.05 % foam Apply to affected areas on the scalp twice daily 100 g 0    hydrocortisone 2.5 % Ointment Apply 1 Application topically 2 times a day. 30 g 2    QUEtiapine (SEROQUEL) 25 MG Tab TAKE 4 TABLETS BY MOUTH AT BEDTIME 120 Tablet 0    albuterol 108 (90 Base) MCG/ACT Aero Soln inhalation aerosol Inhale 2 Puffs every 6 hours as needed for Shortness of Breath.      triamcinolone acetonide (KENALOG) 0.1 % Cream Apply  topically 2 times a day.      Clobetasol Emul Foam w/MoistCr 0.05 % Kit Apply  topically.      flunisolide (NASALIDE) 25 MCG/ACT (0.025%) Solution Administer 2 Sprays into affected nostril(S) 2 times a day. 1 Each 5     No current facility-administered medications for this visit.       Patient Active Problem List    Diagnosis Date Noted    Chronic bilateral low back pain with right-sided sciatica 08/01/2023    Type 2 diabetes mellitus without complication, without long-term current use of insulin ([facility]) 12/08/2022    Dyslipidemia 12/08/2022    PCOS (polycystic ovarian syndrome)     Migraine headache without aura     Asthma     Bipolar 1 disorder ([facility])     GERD (gastroesophageal reflux disease)         Objective:   Ht 1.6 m (5' 3\")   Wt 87.5 kg (193 lb)   BMI 34.19 kg/m²     Physical Exam:  Constitutional: Alert, no distress, well-groomed.  Psych: Alert and oriented x3, normal affect and mood.     Assessment and Plan: "   1. Type 2 diabetes mellitus with hyperglycemia, without long-term current use of insulin (HCC)  Chronic, uncontrolled with ideal goal A1c less than 6.5, after discussion decided to initiate Ozempic 0.25 mg weekly, follow-up in 1 month.  Hopefully she will experience weight loss with this as well.  She continues on metformin 1000 mg twice daily.  - Semaglutide,0.25 or 0.5MG/DOS, (OZEMPIC, 0.25 OR 0.5 MG/DOSE,) 2 MG/3ML Solution Pen-injector; Inject 0.25 mg under the skin every 7 days.  Dispense: 3 mL; Refill: 11    2. Elevated transaminase level  Suspect probable nonalcoholic fatty liver disease given comorbidities of type 2 diabetes mellitus and obesity with BMI 34, denies alcohol use.  Discussed importance of healthy diet and regular exercise

## 2023-10-31 ENCOUNTER — PATIENT MESSAGE (OUTPATIENT)
Dept: MEDICAL GROUP | Facility: PHYSICIAN GROUP | Age: 43
End: 2023-10-31

## 2023-10-31 ENCOUNTER — GYNECOLOGY VISIT (OUTPATIENT)
Dept: OBGYN | Facility: CLINIC | Age: 43
End: 2023-10-31
Payer: COMMERCIAL

## 2023-10-31 VITALS — BODY MASS INDEX: 34.54 KG/M2 | WEIGHT: 195 LBS | SYSTOLIC BLOOD PRESSURE: 112 MMHG | DIASTOLIC BLOOD PRESSURE: 80 MMHG

## 2023-10-31 DIAGNOSIS — G43.019 INTRACTABLE MIGRAINE WITHOUT AURA AND WITHOUT STATUS MIGRAINOSUS: ICD-10-CM

## 2023-10-31 DIAGNOSIS — R41.3 MEMORY DISTURBANCE: ICD-10-CM

## 2023-10-31 DIAGNOSIS — Z30.09 COUNSELING FOR BIRTH CONTROL, ORAL CONTRACEPTIVES: ICD-10-CM

## 2023-10-31 DIAGNOSIS — Z01.419 ENCOUNTER FOR WELL WOMAN EXAM: ICD-10-CM

## 2023-10-31 DIAGNOSIS — E11.65 TYPE 2 DIABETES MELLITUS WITH HYPERGLYCEMIA, WITHOUT LONG-TERM CURRENT USE OF INSULIN (HCC): ICD-10-CM

## 2023-10-31 DIAGNOSIS — Z12.31 ENCOUNTER FOR SCREENING MAMMOGRAM FOR MALIGNANT NEOPLASM OF BREAST: ICD-10-CM

## 2023-10-31 PROCEDURE — 3079F DIAST BP 80-89 MM HG: CPT | Performed by: STUDENT IN AN ORGANIZED HEALTH CARE EDUCATION/TRAINING PROGRAM

## 2023-10-31 PROCEDURE — 99396 PREV VISIT EST AGE 40-64: CPT | Performed by: STUDENT IN AN ORGANIZED HEALTH CARE EDUCATION/TRAINING PROGRAM

## 2023-10-31 PROCEDURE — 3074F SYST BP LT 130 MM HG: CPT | Performed by: STUDENT IN AN ORGANIZED HEALTH CARE EDUCATION/TRAINING PROGRAM

## 2023-10-31 ASSESSMENT — FIBROSIS 4 INDEX: FIB4 SCORE: 1.1

## 2023-10-31 NOTE — PROGRESS NOTES
ANNUAL GYNECOLOGY VISIT    Chief Complaint  Annual Exam  Annual Exam (With pap)      Subjective  Sherry Hatfield is a 43 y.o. female  No LMP recorded (lmp unknown). (Menstrual status: Irregular Menses). Currently is on OCP (Drospirone) for contraception. Presents today for Annual Exam.  Patient is seen by primary care doctor as well as a psychiatrist.  She states that over the past few months she has had significant hot flashes.  She did have a span where she was not taking her OCP and states that her hot flashes were significantly worse.  Seen by her primary care who did a work-up for her hot flashes.  It was recommended that she follow-up here with us.  She also notes that she has had significant memory loss as of recently.  She is followed by a psychiatrist who has her on medication for her bipolar disorder.  Her psychiatrist thought that her oxy carbamazepine could be causing some of her memory issues.  They are working on lowering her dose to see if this helps.  When she lived in [place] she previously was seeing a neurologist for her history of migraines.  I did discuss with her that it may be reasonable to get her established with a neurologist here for further evaluation of her memory issues.    She believes mom went through menopause in last 40s.   Denies tobacco us. Hx of migraines, but denies migraines with aura. Denies VTE/clotting disorder.          Occupation:       Preventive Care   Immunization History   Administered Date(s) Administered    Influenza Vaccine Quad Inj (Pf) 2021, 2022, 10/20/2023    PFIZER PURPLE CAP SARS-COV-2 VACCINATION (12+) 2021, 2021, 2021     Last Pap: 2022 NILM; HPV negative  Any abnormal pap smears?  yes - Hx of HPV. States last abnormal pap was   Last Mammogram: n/a  Last Colonoscopy: n/a  Last DEXA: n/a  Immunizations: all desired UTD      Gynecology History  Current Sexual Activity: yes - male partner  Currently  in a relationship: yes  Feel safe in your current relationship: yes  History of sexually transmitted diseases? yes - HPV  Abnormal discharge? no  Menopause: no  Postmenopausal bleeding: no    Menstrual History  No LMP recorded (lmp unknown). (Menstrual status: Irregular Menses).  Takes OCP continuously. Does stop every 3-4 months.     Contraception  Current: OCP  Past: OCP      Cancer Risk Assessement:  Family history of:   - Breast cancer: no   - Ovarian cancer: no   - Uterine cancer: no   - Colon cancer: no    Obstetric History  OB History    Para Term  AB Living   10       5 5   SAB IAB Ectopic Molar Multiple Live Births   3         5      # Outcome Date GA Lbr Piotr/2nd Weight Sex Delivery Anes PTL Lv   10      M Vag-Spont   SAI   9      M Vag-Spont   SAI   8      M Vag-Spont   SAI   7      M CS-Unspec   SAI   6      M CS-Unspec   SAI   5 AB            4 AB            3 SAB            2 SAB            1 SAB                Past Medical History  Past Medical History:   Diagnosis Date    Asthma     Bipolar 1 disorder (HCC)     GERD (gastroesophageal reflux disease)     Migraine headache without aura     PCOS (polycystic ovarian syndrome)        Past Surgical History  Past Surgical History:   Procedure Laterality Date    PRIMARY C SECTION      x2    TUBAL COAGULATION LAPAROSCOPIC BILATERAL         Social History  Social History     Tobacco Use    Smoking status: Never    Smokeless tobacco: Never   Vaping Use    Vaping Use: Never used   Substance Use Topics    Alcohol use: Never    Drug use: Never        Family History  Family History   Problem Relation Age of Onset    GI Disease Mother         liver cirrhosis s/p transplant due to ETOH    Diabetes Mother     Cancer Mother         basal cell cancer    Alcohol abuse Mother     Heart Disease Father         CHF    GI Disease Father         early liver cirrhosis    Alcohol abuse Father     Diabetes Father     Kidney Disease  Father         ESRD on dialysis    Obesity Sister     Diabetes Sister     Alcohol abuse Sister     No Known Problems Brother        Home Medications  Current Outpatient Medications   Medication Sig    Drospirenone 4 MG Tab Take 4 mg by mouth every day.    Semaglutide,0.25 or 0.5MG/DOS, (OZEMPIC, 0.25 OR 0.5 MG/DOSE,) 2 MG/3ML Solution Pen-injector Inject 0.25 mg under the skin every 7 days.    ketoconazole (NIZORAL) 2 % Cream Apply 1 Application topically 1 time a day as needed (Rash).    metFORMIN ER (GLUCOPHAGE XR) 500 MG TABLET SR 24 HR Take 2 Tablets by mouth 2 times a day.    atorvastatin (LIPITOR) 20 MG Tab Take 1 Tablet by mouth every evening.    tizanidine (ZANAFLEX) 2 MG capsule TAKE 1 TO 2 CAPSULES BY MOUTH THREE TIMES DAILY AS NEEDED FOR SPASM    diclofenac sodium (VOLTAREN) 1 % Gel Apply 4 g topically 4 times a day as needed (back/neck pain).    OXcarbazepine (TRILEPTAL) 300 MG Tab Take 300-600 mg by mouth 2 times a day. Taking 300mg in the AM and 600mg at night    rabeprazole (ACIPHEX) 20 MG tablet Take 20 mg by mouth every day.    azelastine (ASTELIN) 137 MCG/SPRAY nasal spray USE 1 SPRAY(S) IN EACH NOSTRIL TWICE DAILY, USE WITH FLUNISOLIDE NASLA SPRAY    Clobetasol Propionate Emulsion 0.05 % Foam AAA daily for 2 weeks then use as needed    tretinoin (RETIN-A) 0.05 % cream Apply  topically every evening.    naratriptan (AMERGE) 2.5 MG tablet Take 1 Tablet by mouth one time as needed for Migraine for up to 1 dose.    propranolol (INDERAL) 20 MG Tab TAKE 2 TABLETS BY MOUTH  TWICE DAILY    spironolactone (ALDACTONE) 25 MG Tab TAKE 1 TABLET BY MOUTH  TWICE DAILY    buPROPion (WELLBUTRIN XL) 300 MG XL tablet     hydrOXYzine HCl (ATARAX) 25 MG Tab     ondansetron (ZOFRAN) 4 MG Tab tablet TAKE 1 TABLET BY MOUTH EVERY 6 HOURS AS NEEDED FOR NAUSEA AND VOMITING FOR UP TO 7 DAYS    ADVAIR -21 MCG/ACT inhaler     SPIRIVA RESPIMAT 1.25 MCG/ACT Aero Soln     clobetasol (OLUX) 0.05 % foam Apply to affected  areas on the scalp twice daily    hydrocortisone 2.5 % Ointment Apply 1 Application topically 2 times a day.    QUEtiapine (SEROQUEL) 25 MG Tab TAKE 4 TABLETS BY MOUTH AT BEDTIME    albuterol 108 (90 Base) MCG/ACT Aero Soln inhalation aerosol Inhale 2 Puffs every 6 hours as needed for Shortness of Breath.    triamcinolone acetonide (KENALOG) 0.1 % Cream Apply  topically 2 times a day.    Clobetasol Emul Foam w/MoistCr 0.05 % Kit Apply  topically.    flunisolide (NASALIDE) 25 MCG/ACT (0.025%) Solution Administer 2 Sprays into affected nostril(S) 2 times a day.       Allergies/Reactions  Allergies   Allergen Reactions    Morphine      itchy       ROS  Review of Systems:  Gen: no fevers or chills, no significant weight loss or gain  Respiratory:  no cough or dyspnea  Cardiac:  no chest pain, no palpitations, no syncope  GI:  no heartburn, no abdominal pain, no nausea or vomiting  Psych: no depression or anxiety    Objective  /80   Wt 195 lb   LMP  (LMP Unknown)   BMI 34.54 kg/m²     Constitutional: The patient is well developed and well nourished.  Psychiatric: Patient is oriented to time place and person.   Skin: No rash observed.  Neck: Appears symmetric. Thyroid normal size  Respiratory: normal effort  Breast: Inspection reveals no asymetry or nipple discharge, no skin thickening, dimpling or erythema.  Palpation demonstrates no masses.  Abdomen: Soft, non-tender.  Pelvic Exam:      Vulva: external female genitalia are normal in appearance. No lesions     Urethra - no lesions, no erythema     Vagina: moist, pink, normal ruggae     Cervix: pink, smooth, no lesions, no CMT     Uterus - non-tender, normal size, shape, contour, mobile, anteverted     Ovaries: non-tender, no appreciable masses   Pap Smear performed: No   Chaperone Present: Aislinn Sofia MA  Extremities: Legs are symmetric and without tenderness. There is no edema present.      Assessment & Plan  Sherry Hatfield is a 43 y.o. female who  presents today for Annual Gyn Exam.     1. Health Maintenance   PAP: UTD last pap 4/29/2022 and normal  STI Screen (HIV, Syphilis, Chlamydia / Gonorrhea): declines  MAMMOGRAM: MA-SCREENING MAMMO BILAT W/TOMOSYNTHESIS W/CAD; Future  COLONOSCOPY: n/a  DEXA: n/a  IMMUNIZATIONS: all desired UTD  TOBACCO: declines  COUNSELING: breast self exam and mammography screening          2. Encounter for screening mammogram for malignant neoplasm of breast  - MA-SCREENING MAMMO BILAT W/TOMOSYNTHESIS W/CAD; Future    3. Counseling for birth control, oral contraceptives  - birth control options discussed in detail including pill, patch, ring, shot, implant, IUD   - discussed use and SE of each option.     - discussed that OCP and ring allow for scheduled monthly periods and or manipulation of cycles if desired to skip periods.  SE profile for each is similar   - dicussed that progesterone only options, namely shot and implant are most associated with weight gain, namely depo.   - discussed benefits of ring, shot, implant in negating need to take daily pill   - discussed abnormal bleeding associated with shot and implants, often being most common reason for stopping/removal   - discussed obesity and unproven impact on contraceptive efficacy   - patient no history of uncontrolled HTN or DM, denies history of migraines with aura or blood clots.   - discussed possible SE of any hormonal contraception may include: N/V, HA, menstrual changes, weight fluctuation, breast tenderness, abdominal pain, anxiety or depression, DVT   - counseled that contraception does not protect against STDs and condom use was recommended   - The patient has been told of the more serious potential side effects such as MI, stroke, and deep vein thrombosis, all of which are very unlikely. She has been asked to report any signs of such serious problems immediately. The need for additional protection, such as a condom, to prevent exposure to sexually transmitted  diseases has also been discussed- the patient has been clearly reminded that OCP's cannot protect her against diseases such as HIV and others. She understands and wishes to take the medication as prescribed.   - Drospirenone 4 MG Tab; Take 4 mg by mouth every day.  Dispense: 84 Tablet; Refill: 3    4. Memory disturbance  **She does frequently bring up memory disturbances that have been bothering her for few years now.  She is followed and seen by a psychiatrist who has her on multiple medications.  She does state that she is going to counseling once every other week and states that this is helping but she does have a hard time finding time for this due to her work schedule.  We did have a lengthy discussion that she may need to be evaluated by neurologist due to her memory disturbances that she is experiencing.  I discussed with her that I can place this referral or she can discuss this further with her primary care doctor who can place a referral for her.**      Return: Annually or PRN    Anjelica Becerra P.A.-C.  10/31/2023

## 2023-11-07 ENCOUNTER — TELEPHONE (OUTPATIENT)
Dept: HEALTH INFORMATION MANAGEMENT | Facility: OTHER | Age: 43
End: 2023-11-07
Payer: COMMERCIAL

## 2023-11-07 RX ORDER — SEMAGLUTIDE 0.68 MG/ML
0.5 INJECTION, SOLUTION SUBCUTANEOUS
Qty: 3 ML | Refills: 11 | Status: SHIPPED | OUTPATIENT
Start: 2023-11-07

## 2023-11-08 ENCOUNTER — TELEPHONE (OUTPATIENT)
Dept: OBGYN | Facility: CLINIC | Age: 43
End: 2023-11-08
Payer: COMMERCIAL

## 2023-11-08 NOTE — TELEPHONE ENCOUNTER
Spoke to [employer] this morning regarding prior auth needed for pts BC.  They stated that it was denied and the request needed to be faxed into [facility] appeals.  Fax was submitted successfully!  Confirmation scanned into pts media

## 2023-11-09 ENCOUNTER — PATIENT MESSAGE (OUTPATIENT)
Dept: MEDICAL GROUP | Facility: PHYSICIAN GROUP | Age: 43
End: 2023-11-09
Payer: COMMERCIAL

## 2023-11-09 DIAGNOSIS — Z91.89 RISK FACTORS FOR OBSTRUCTIVE SLEEP APNEA: ICD-10-CM

## 2023-11-09 DIAGNOSIS — R06.83 LOUD SNORING: ICD-10-CM

## 2023-11-09 DIAGNOSIS — R40.0 DAYTIME SOMNOLENCE: ICD-10-CM

## 2023-11-10 ENCOUNTER — TELEPHONE (OUTPATIENT)
Dept: HEALTH INFORMATION MANAGEMENT | Facility: OTHER | Age: 43
End: 2023-11-10
Payer: COMMERCIAL

## 2023-11-10 ENCOUNTER — HOSPITAL ENCOUNTER (OUTPATIENT)
Dept: RADIOLOGY | Facility: MEDICAL CENTER | Age: 43
End: 2023-11-10
Payer: COMMERCIAL

## 2023-11-13 DIAGNOSIS — N94.10 DYSPAREUNIA, FEMALE: ICD-10-CM

## 2023-11-13 DIAGNOSIS — N89.8 VAGINAL DRYNESS: ICD-10-CM

## 2023-11-21 ENCOUNTER — TELEPHONE (OUTPATIENT)
Dept: HEALTH INFORMATION MANAGEMENT | Facility: OTHER | Age: 43
End: 2023-11-21
Payer: COMMERCIAL

## 2023-12-04 DIAGNOSIS — G43.019 INTRACTABLE MIGRAINE WITHOUT AURA AND WITHOUT STATUS MIGRAINOSUS: ICD-10-CM

## 2023-12-05 RX ORDER — NARATRIPTAN 2.5 MG/1
2.5 TABLET ORAL
Qty: 20 TABLET | Refills: 3 | Status: SHIPPED | OUTPATIENT
Start: 2023-12-05

## 2024-02-01 RX ORDER — ESTRADIOL 0.1 MG/G
CREAM VAGINAL
Qty: 42.5 G | Refills: 3 | Status: SHIPPED | OUTPATIENT
Start: 2024-02-01

## 2024-02-27 ENCOUNTER — TELEPHONE (OUTPATIENT)
Dept: OBGYN | Facility: CLINIC | Age: 44
End: 2024-02-27
Payer: COMMERCIAL

## 2024-02-27 NOTE — TELEPHONE ENCOUNTER
"2/27/2024 1524  Called pt in response to pt advise request. Forwarded request to Anjelica Becerra PA-C who recommended that pt make an appt with an MD.  Scheduled pt with Dr Moreno on 2/29/2024 at 0745 to discuss reaction to Estradiol cream. Pt states that her \"vagina is burning when she is sitting down.\"  "

## 2024-04-01 ENCOUNTER — GYNECOLOGY VISIT (OUTPATIENT)
Dept: OBGYN | Facility: CLINIC | Age: 44
End: 2024-04-01
Payer: COMMERCIAL

## 2024-04-01 ENCOUNTER — HOSPITAL ENCOUNTER (OUTPATIENT)
Facility: MEDICAL CENTER | Age: 44
End: 2024-04-01
Attending: OBSTETRICS & GYNECOLOGY
Payer: COMMERCIAL

## 2024-04-01 ENCOUNTER — PATIENT MESSAGE (OUTPATIENT)
Dept: MEDICAL GROUP | Facility: PHYSICIAN GROUP | Age: 44
End: 2024-04-01

## 2024-04-01 VITALS — BODY MASS INDEX: 31.18 KG/M2 | WEIGHT: 176 LBS | DIASTOLIC BLOOD PRESSURE: 83 MMHG | SYSTOLIC BLOOD PRESSURE: 117 MMHG

## 2024-04-01 DIAGNOSIS — N76.1 SUBACUTE VAGINITIS: ICD-10-CM

## 2024-04-01 DIAGNOSIS — E28.2 PCOS (POLYCYSTIC OVARIAN SYNDROME): ICD-10-CM

## 2024-04-01 DIAGNOSIS — N90.89 VULVAR IRRITATION: ICD-10-CM

## 2024-04-01 DIAGNOSIS — G43.019 INTRACTABLE MIGRAINE WITHOUT AURA AND WITHOUT STATUS MIGRAINOSUS: ICD-10-CM

## 2024-04-01 PROCEDURE — 87660 TRICHOMONAS VAGIN DIR PROBE: CPT

## 2024-04-01 PROCEDURE — 3079F DIAST BP 80-89 MM HG: CPT | Performed by: OBSTETRICS & GYNECOLOGY

## 2024-04-01 PROCEDURE — 99214 OFFICE O/P EST MOD 30 MIN: CPT | Performed by: OBSTETRICS & GYNECOLOGY

## 2024-04-01 PROCEDURE — 87480 CANDIDA DNA DIR PROBE: CPT

## 2024-04-01 PROCEDURE — 3074F SYST BP LT 130 MM HG: CPT | Performed by: OBSTETRICS & GYNECOLOGY

## 2024-04-01 PROCEDURE — 87510 GARDNER VAG DNA DIR PROBE: CPT

## 2024-04-01 RX ORDER — FLUCONAZOLE 150 MG/1
150 TABLET ORAL ONCE
Qty: 1 TABLET | Refills: 0 | Status: SHIPPED | OUTPATIENT
Start: 2024-04-01 | End: 2024-04-01

## 2024-04-01 RX ORDER — CONJUGATED ESTROGENS 0.62 MG/G
0.5 CREAM VAGINAL
Qty: 1 EACH | Refills: 6 | Status: SHIPPED | OUTPATIENT
Start: 2024-04-01

## 2024-04-01 ASSESSMENT — FIBROSIS 4 INDEX: FIB4 SCORE: 1.1

## 2024-04-01 NOTE — PROGRESS NOTES
GYN FOLLOW UP VISIT    CC:  Gynecologic Exam       HPI: Patient is a 43 y.o.  who presents for concerns of persistent vulvar irritation and 'fissures'. Was prescribed vaginal estrogen cream about 6 months ago and used premarin which had helped her symptoms immensely, however her insurance changed recently and premarin was not covered. She was switched to Estrace last 2 months however Estrace has not been effective at controlling her symptoms. She even tried OTC monistat therapy last week to see if it was a yeast infection, however this did not help. Her  had observed a small 'cut' on her vulva about 3 days ago.        ROS:   General: denies fever / chills  HEENT: denies sore throat:  CV: denies chest pain:  Repiratory: denies shortness of breath  GI: denies abdominal pain  : denies dysuria:    PFSH:  I personally reviewed the past medical and surgical histories.       PHYSICAL EXAMINATION:  Vital Signs:   Vitals:    24 1537   BP: 117/83   BP Location: Right arm   Patient Position: Sitting   BP Cuff Size: Large adult   Weight: 176 lb     Body mass index is 31.18 kg/m².    Gen: appears well, NAD  Respiratory: normal effort  Abdomen: Soft, non-tender.  Pelvic Exam:    Vulva: normal.     Urethra: normal.   Vagina: normal. White particulate discharge present. Vag path collected.    Cervix: normal.    Bimanual deferred    ASSESSMENT AND PLAN:  43 y.o.     1. Subacute vaginitis  VAGINAL PATHOGENS DNA PANEL   Exam was notable for discharge that is consistent with likely yeast. Vaginal path panel collected. Rx diflucan 150 mg PO once empirically.    2. Vulvar irritation     Will re prescribe premarin vaginal estrogen cream with PA if necessary. If still uncovered, may consider trial of Estring.          RTC in 4-6 weeks for follow up.     Time spent: 20 minutes    Jose M Jarvis M.D.

## 2024-04-01 NOTE — PROGRESS NOTES
Patient here for a GYN follow up.   Last seen on 10/31/2023  c/o itching and burning   pharmacy verified.  Patient phone #: [phone]

## 2024-04-03 DIAGNOSIS — E28.2 PCOS (POLYCYSTIC OVARIAN SYNDROME): ICD-10-CM

## 2024-04-03 RX ORDER — PROPRANOLOL HYDROCHLORIDE 20 MG/1
40 TABLET ORAL 2 TIMES DAILY
Qty: 360 TABLET | Refills: 3 | Status: SHIPPED | OUTPATIENT
Start: 2024-04-03

## 2024-04-03 RX ORDER — SPIRONOLACTONE 25 MG/1
25 TABLET ORAL 2 TIMES DAILY
Qty: 180 TABLET | Refills: 3 | Status: SHIPPED | OUTPATIENT
Start: 2024-04-03

## 2024-04-03 NOTE — PATIENT COMMUNICATION
Received request via: Patient    Was the patient seen in the last year in this department? Yes    Does the patient have an active prescription (recently filled or refills available) for medication(s) requested? No    Pharmacy Name: [facility]    Does the patient have shelter Plus and need 100 day supply (blood pressure, diabetes and cholesterol meds only)? Patient does not have SCP

## 2024-04-08 ENCOUNTER — TELEPHONE (OUTPATIENT)
Dept: OBGYN | Facility: CLINIC | Age: 44
End: 2024-04-08
Payer: COMMERCIAL

## 2024-04-08 DIAGNOSIS — N94.10 DYSPAREUNIA, FEMALE: ICD-10-CM

## 2024-04-08 DIAGNOSIS — N89.8 VAGINAL DRYNESS: ICD-10-CM

## 2024-04-08 RX ORDER — ESTRADIOL 10 UG/1
10 INSERT VAGINAL
Qty: 12 TABLET | Refills: 0 | Status: SHIPPED | OUTPATIENT
Start: 2024-04-08 | End: 2024-04-29

## 2024-04-18 ASSESSMENT — ENCOUNTER SYMPTOMS
SLEEP DISTURBANCE: 0
TIME GOING TO BED: 9-10PM

## 2024-04-22 ENCOUNTER — OFFICE VISIT (OUTPATIENT)
Dept: SLEEP MEDICINE | Facility: MEDICAL CENTER | Age: 44
End: 2024-04-22
Attending: FAMILY MEDICINE
Payer: COMMERCIAL

## 2024-04-22 VITALS
RESPIRATION RATE: 16 BRPM | OXYGEN SATURATION: 97 % | WEIGHT: 172.7 LBS | SYSTOLIC BLOOD PRESSURE: 118 MMHG | BODY MASS INDEX: 27.76 KG/M2 | DIASTOLIC BLOOD PRESSURE: 78 MMHG | HEIGHT: 66 IN | HEART RATE: 85 BPM

## 2024-04-22 DIAGNOSIS — R06.83 LOUD SNORING: ICD-10-CM

## 2024-04-22 DIAGNOSIS — G47.30 SLEEP DISORDER BREATHING: ICD-10-CM

## 2024-04-22 DIAGNOSIS — R06.81 WITNESSED EPISODE OF APNEA: ICD-10-CM

## 2024-04-22 DIAGNOSIS — R40.0 DAYTIME SOMNOLENCE: ICD-10-CM

## 2024-04-22 DIAGNOSIS — Z91.89 RISK FACTORS FOR OBSTRUCTIVE SLEEP APNEA: ICD-10-CM

## 2024-04-22 PROCEDURE — 99213 OFFICE O/P EST LOW 20 MIN: CPT | Performed by: STUDENT IN AN ORGANIZED HEALTH CARE EDUCATION/TRAINING PROGRAM

## 2024-04-22 PROCEDURE — 3078F DIAST BP <80 MM HG: CPT | Performed by: STUDENT IN AN ORGANIZED HEALTH CARE EDUCATION/TRAINING PROGRAM

## 2024-04-22 PROCEDURE — 99204 OFFICE O/P NEW MOD 45 MIN: CPT | Performed by: STUDENT IN AN ORGANIZED HEALTH CARE EDUCATION/TRAINING PROGRAM

## 2024-04-22 PROCEDURE — 3074F SYST BP LT 130 MM HG: CPT | Performed by: STUDENT IN AN ORGANIZED HEALTH CARE EDUCATION/TRAINING PROGRAM

## 2024-04-22 ASSESSMENT — FIBROSIS 4 INDEX: FIB4 SCORE: 1.1

## 2024-04-22 NOTE — PROGRESS NOTES
[facility] Consult Note     Date: 4/22/2024 / Time: 7:53 AM      Thank you for requesting a sleep medicine consultation on Sherry Hatfield at the sleep center. Presents today with the chief complaints of snoring, unrefreshing sleep, brain fog occasional excessive daytime sleepiness. She is referred by Jonnathan Lopez M.D.  [address] [place] [address] for evaluation and treatment of sleep disorder breathing .     HISTORY OF PRESENT ILLNESS:     Sherry Hatfield is a 43 y.o. female with asthma, migraines, type 2 diabetes mellitus, GERD, dyslipidemia, and bipolar 1 disorder.  Presents to Sleep Clinic for evaluation of sleep.    She reports for the last 6 months to a year she has had episodes of waking up gasping for breath and feeling as though she is short of breath.  She finds her sleep nonrestorative.  She does snore loudly in her sleep.  She never wakes up rested and she always feels tired during the day.  She does have difficulty concentration and memory and irritability.  This has impacted her work performance.  She feels that her poor sleep is impacting her ability to function during the day.    She states she did gain weight but has since lost weight however the symptoms still persist.  She is napping almost daily for 20 to 30 minutes on her breaks.    She does grind her teeth at night and she does wake up with headaches.    As per supplemental questionnaire to be scanned or imported into chart:    [place] Sleepiness Score: 9    Sleep Schedule  Bedtime: Weekday 9-10pm Weekend 9-10pm  Wake time: Weekday 7am Weekend 7-10am  Sleep-onset latency: mins  Awakenings from sleep: 2-3  Difficulty falling back asleep: sometimes, 2-3 days a week >30min   Bedroom partner: yes  Naps: Yes daily, 20-30 min, on breaks,     DAYTIME SYMPTOMS:   Excessive daytime sleepiness: No   Daytime fatigue: Yes  Difficulty concentrating: Yes  Memory problems:  "Yes  Irritability:Yes  Work/school performance issues: Yes  Sleepiness with driving: Yes  Caffeine/stimulant use: No   Alcohol use:No     SLEEP RELATED SYMPTOMS  Snoring: Yes  Witnessed apnea or gasping/choking: Yes gasping   Dry mouth or mouth breathing: Yes  Sweating: No   Teeth grinding/biting: Yes  Morning headaches: Yes  Refreshed Upon Awakening: No      SLEEP RELATED BEHAVIORS:  Parasomnias (walking, talking, eating, violence): Yes, talking sometimes   Leg kicking: No   Restless legs - \"urge to move\": No   Nightmares: Yes Recurrent: No   Dream enactment: No      NARCOLEPSY:  Cataplexy: No   Sleep paralysis: Yes rare happened a couple months ago  Sleep attacks: No   Hypnagogic/hypnopompic hallucinations: No     MEDICAL HISTORY  Past Medical History:   Diagnosis Date    Apnea, sleep     Asthma     Bipolar 1 disorder (HCC)     Daytime sleepiness     Gasping for breath     GERD (gastroesophageal reflux disease)     Insomnia     Migraine headache without aura     Morning headache     PCOS (polycystic ovarian syndrome)     Snoring         SURGICAL HISTORY  Past Surgical History:   Procedure Laterality Date    PRIMARY C SECTION      x2    TUBAL COAGULATION LAPAROSCOPIC BILATERAL          FAMILY HISTORY  Family History   Problem Relation Age of Onset    GI Disease Mother         liver cirrhosis s/p transplant due to ETOH    Diabetes Mother     Cancer Mother         basal cell cancer    Alcohol abuse Mother     Heart Disease Father         CHF    GI Disease Father         early liver cirrhosis    Alcohol abuse Father     Diabetes Father     Kidney Disease Father         ESRD on dialysis    Obesity Sister     Diabetes Sister     Alcohol abuse Sister     No Known Problems Brother        SOCIAL HISTORY  Social History     Socioeconomic History    Marital status:     Number of children: 5   Occupational History    Occupation:      Comment: [employer]   Tobacco Use    Smoking status: " Never    Smokeless tobacco: Never   Vaping Use    Vaping Use: Never used   Substance and Sexual Activity    Alcohol use: Never    Drug use: Never    Sexual activity: Yes     Partners: Male     Birth control/protection: Pill        Occupation:      CURRENT MEDICATIONS  Current Outpatient Medications   Medication Sig Dispense Refill    estradiol (VAGIFEM) 10 MCG Tab Insert 1 Tablet into the vagina 3 times a week for 30 days. 12 Tablet 0    spironolactone (ALDACTONE) 25 MG Tab Take 1 Tablet by mouth 2 times a day. 180 Tablet 3    propranolol (INDERAL) 20 MG Tab Take 2 Tablets by mouth 2 times a day. 360 Tablet 3    estrogens, conjugated (PREMARIN) 0.625 MG/GM Cream Insert 0.5 g into the vagina two times a week. 1 Each 6    naratriptan (AMERGE) 2.5 MG tablet Take 1 Tablet by mouth one time as needed for Migraine for up to 1 dose. 20 Tablet 3    Semaglutide,0.25 or 0.5MG/DOS, (OZEMPIC, 0.25 OR 0.5 MG/DOSE,) 2 MG/3ML Solution Pen-injector Inject 0.5 mg under the skin every 7 days. 3 mL 11    Drospirenone 4 MG Tab Take 4 mg by mouth every day. 84 Tablet 3    ketoconazole (NIZORAL) 2 % Cream Apply 1 Application topically 1 time a day as needed (Rash). 30 g 0    metFORMIN ER (GLUCOPHAGE XR) 500 MG TABLET SR 24 HR Take 2 Tablets by mouth 2 times a day. 360 Tablet 3    atorvastatin (LIPITOR) 20 MG Tab Take 1 Tablet by mouth every evening. 90 Tablet 3    tizanidine (ZANAFLEX) 2 MG capsule TAKE 1 TO 2 CAPSULES BY MOUTH THREE TIMES DAILY AS NEEDED FOR SPASM 90 Capsule 0    diclofenac sodium (VOLTAREN) 1 % Gel Apply 4 g topically 4 times a day as needed (back/neck pain). 150 g 3    OXcarbazepine (TRILEPTAL) 300 MG Tab Take 300-600 mg by mouth 2 times a day. Taking 300mg in the AM and 600mg at night      rabeprazole (ACIPHEX) 20 MG tablet Take 20 mg by mouth every day.      azelastine (ASTELIN) 137 MCG/SPRAY nasal spray USE 1 SPRAY(S) IN EACH NOSTRIL TWICE DAILY, USE WITH FLUNISOLIDE NASLA SPRAY      Clobetasol  "Propionate Emulsion 0.05 % Foam AAA daily for 2 weeks then use as needed 100 g 1    tretinoin (RETIN-A) 0.05 % cream Apply  topically every evening. 45 g 5    buPROPion (WELLBUTRIN XL) 300 MG XL tablet       hydrOXYzine HCl (ATARAX) 25 MG Tab       ondansetron (ZOFRAN) 4 MG Tab tablet TAKE 1 TABLET BY MOUTH EVERY 6 HOURS AS NEEDED FOR NAUSEA AND VOMITING FOR UP TO 7 DAYS      ADVAIR -21 MCG/ACT inhaler       SPIRIVA RESPIMAT 1.25 MCG/ACT Aero Soln       clobetasol (OLUX) 0.05 % foam Apply to affected areas on the scalp twice daily 100 g 0    hydrocortisone 2.5 % Ointment Apply 1 Application topically 2 times a day. 30 g 2    QUEtiapine (SEROQUEL) 25 MG Tab TAKE 4 TABLETS BY MOUTH AT BEDTIME 120 Tablet 0    albuterol 108 (90 Base) MCG/ACT Aero Soln inhalation aerosol Inhale 2 Puffs every 6 hours as needed for Shortness of Breath.      triamcinolone acetonide (KENALOG) 0.1 % Cream Apply  topically 2 times a day.      Clobetasol Emul Foam w/MoistCr 0.05 % Kit Apply  topically.      flunisolide (NASALIDE) 25 MCG/ACT (0.025%) Solution Administer 2 Sprays into affected nostril(S) 2 times a day. 1 Each 5     No current facility-administered medications for this visit.       REVIEW OF SYSTEMS  Constitutional: Denies fevers, Denies weight changes  Ears/Nose/Throat/Mouth: Denies nasal congestion or sore throat   Cardiovascular: Denies chest pain  Respiratory: Denies shortness of breath, Denies cough  Gastrointestinal/Hepatic: Denies nausea, vomiting  Sleep: see HPI    Physical Examination:  Vitals/ General Appearance:   Weight/BMI: Body mass index is 27.87 kg/m².  /78 (BP Location: Left arm, Patient Position: Sitting, BP Cuff Size: Adult)   Pulse 85   Resp 16   Ht 1.676 m (5' 6\")   Wt 78.3 kg (172 lb 11.2 oz)   SpO2 97%   Vitals:    04/22/24 0751   BP: 118/78   BP Location: Left arm   Patient Position: Sitting   BP Cuff Size: Adult   Pulse: 85   Resp: 16   SpO2: 97%   Weight: 78.3 kg (172 lb 11.2 oz) " "  Height: 1.676 m (5' 6\")       Pt. is alert and oriented to time, place and person. Cooperative and in no apparent distress.     Constitutional: Alert, no distress, well-groomed.  Skin: No rashes in visible areas.  Eye: Round. Conjunctiva clear, lids normal. No icterus.   ENT EXAM  Nasal alae/valves collapsible: No   Nasal septum deviation: Yes  Nasal turbinate hypertrophy: Left: Grade 1   Right: Grade 1  Hard palate narrow: No   Hard palate high: No   Soft palate/uvula (Mallampati score): 4  Tongue Scalloping: No   Retrognathia: No   Micrognathia: No   Cardiovascular:no murmus/gallops/rubs, normal S1 and S2 heart sounds, regular rate and rhythm  Pulmonary:Clear to auscultation, No wheezes, No crackles.  Neurologic:Awake, alert and oriented x 3, Normal age appropriate gait, No involuntary motions.  Extremities: No clubbing, cyanosis, or edema       ASSESSMENT AND PLAN   Sherry Hatfield is a 43 y.o. female with asthma, migraines, type 2 diabetes mellitus, GERD, dyslipidemia, and bipolar 1 disorder.  Presents to Sleep Clinic for evaluation of sleep.    Sherry Hatfield  has symptoms of Obstructive Sleep Apnea (JOSE). Sherry Hatfield has symptoms of snoring, choking/gasping during sleep,, morning headaches, unrefreshed upon awakening, brain fog, daytime fatigue. These can interfere with activities of daily living.   ESS 9  Pt has risk factors for JOSE include overweight and crowded oropharynx.     The pathophysiology of JOSE and the increased risk of cardiovascular morbidity from untreated JOSE is discussed in detail with the patient. She  also has bipolar 1, GERD, migraine, type 2 diabetes mellitus,  which can be worsened by JOSE.      We have discussed diagnostic options including in-laboratory, attended polysomnography and home sleep testing. We have also discussed treatment options including airway pressurization, reconstructive otolaryngologic surgery, dental appliances and weight management.     " Subsequently,treatment options will be discussed based on the diagnostic study. Meanwhile, She is urged to avoid supine sleep, weight gain and alcoholic beverages since all of these can worsen JOSE. She is cautioned against drowsy driving. If She feels sleepy while driving, advised must pull over for a break/nap, rather than persist on the road, in the interest of Pt's own safety and that of others on the road.    Plan  -  She  will be scheduled for an overnight HST to assess sleep related breathing disorder.  - Follow up 1-2 weeks after sleep study to discuss results and treatment options moving forward   -Advised to reach out via [employer] or by phone with any questions or concerns.       Please note portions of this record was created using voice recognition software. I have made every reasonable attempt to correct obvious errors, but I expect that there are errors of grammar and possibly content I did not discover before finalizing the note.

## 2024-04-28 DIAGNOSIS — N89.8 VAGINAL DRYNESS: ICD-10-CM

## 2024-04-28 DIAGNOSIS — N94.10 DYSPAREUNIA, FEMALE: ICD-10-CM

## 2024-04-29 RX ORDER — ESTRADIOL 10 UG/1
10 INSERT VAGINAL
Qty: 12 TABLET | Refills: 0 | Status: SHIPPED | OUTPATIENT
Start: 2024-04-29

## 2024-05-03 ENCOUNTER — OFFICE VISIT (OUTPATIENT)
Dept: MEDICAL GROUP | Facility: PHYSICIAN GROUP | Age: 44
End: 2024-05-03
Payer: COMMERCIAL

## 2024-05-03 ENCOUNTER — HOSPITAL ENCOUNTER (OUTPATIENT)
Facility: MEDICAL CENTER | Age: 44
End: 2024-05-03
Payer: COMMERCIAL

## 2024-05-03 VITALS
WEIGHT: 173.2 LBS | OXYGEN SATURATION: 95 % | SYSTOLIC BLOOD PRESSURE: 108 MMHG | DIASTOLIC BLOOD PRESSURE: 62 MMHG | BODY MASS INDEX: 30.69 KG/M2 | HEIGHT: 63 IN | TEMPERATURE: 97.5 F | HEART RATE: 89 BPM | RESPIRATION RATE: 18 BRPM

## 2024-05-03 DIAGNOSIS — E11.9 TYPE 2 DIABETES MELLITUS WITHOUT COMPLICATION, WITHOUT LONG-TERM CURRENT USE OF INSULIN (HCC): ICD-10-CM

## 2024-05-03 DIAGNOSIS — M54.42 ACUTE LEFT-SIDED LOW BACK PAIN WITH LEFT-SIDED SCIATICA: ICD-10-CM

## 2024-05-03 LAB
HBA1C MFR BLD: 5.7 % (ref ?–5.8)
POCT INT CON NEG: NEGATIVE
POCT INT CON POS: POSITIVE

## 2024-05-03 PROCEDURE — 3078F DIAST BP <80 MM HG: CPT

## 2024-05-03 PROCEDURE — 3074F SYST BP LT 130 MM HG: CPT

## 2024-05-03 PROCEDURE — 83036 HEMOGLOBIN GLYCOSYLATED A1C: CPT

## 2024-05-03 PROCEDURE — 99214 OFFICE O/P EST MOD 30 MIN: CPT

## 2024-05-03 RX ORDER — TIZANIDINE 4 MG/1
4 TABLET ORAL EVERY 6 HOURS PRN
Qty: 30 TABLET | Refills: 3 | Status: SHIPPED | OUTPATIENT
Start: 2024-05-03

## 2024-05-03 RX ORDER — MELOXICAM 7.5 MG/1
7.5 TABLET ORAL DAILY
Qty: 30 TABLET | Refills: 3 | Status: SHIPPED | OUTPATIENT
Start: 2024-05-03 | End: 2024-05-31 | Stop reason: SDUPTHER

## 2024-05-03 ASSESSMENT — ENCOUNTER SYMPTOMS
TINGLING: 1
BACK PAIN: 1

## 2024-05-03 ASSESSMENT — FIBROSIS 4 INDEX: FIB4 SCORE: 1.1

## 2024-05-03 NOTE — PROGRESS NOTES
"Subjective:     CC: Diagnoses of Acute left-sided low back pain with left-sided sciatica and Type 2 diabetes mellitus without complication, without long-term current use of insulin (HCC) were pertinent to this visit.    Pt of Dr. Lopez presents today accompanied by spouse for acute back pain starting yesterday.    HPI:   Sherry presents today with    Problem   Acute Left-Sided Low Back Pain With Left-Sided Sciatica   Type 2 Diabetes Mellitus Without Complication, Without Long-Term Current Use of Insulin (Hcc)    This is a chronic condition.  Current medications:  Insulin:   Biguanide: metformin er 1000 mg BID  GLP1-RA: increase semaglutide to 1 mg weekly  SGLT-2i:      DPP4-I:   TZD:   Donny:  Sulfonyluria:     Last A1c: 5/3/24 5.7%; 6/18/23 6.8%  Last Microalb/Cr ratio: 5/3/24  Fasting sugars:  Last diabetic foot exam: 5/3/24  Last retinal eye exam: [facility] 12/2023  ACEi/ARB? N/a  Statin? Atorvastatin 20 mg daily  Aspirin? N/a  Concomitant HTN?   Nightly foot checks? recommended           ROS:  Review of Systems   Musculoskeletal:  Positive for back pain.   Neurological:  Positive for tingling.   All other systems reviewed and are negative.      Objective:     Exam:  /62 (BP Location: Right arm, Patient Position: Sitting, BP Cuff Size: Adult)   Pulse 89   Temp 36.4 °C (97.5 °F) (Temporal)   Resp 18   Ht 1.6 m (5' 3\")   Wt 78.6 kg (173 lb 3.2 oz)   SpO2 95%   BMI 30.68 kg/m²  Body mass index is 30.68 kg/m².    Physical Exam  Vitals reviewed.   Constitutional:       General: She is not in acute distress.     Appearance: Normal appearance. She is not ill-appearing.   HENT:      Head: Normocephalic and atraumatic.   Cardiovascular:      Rate and Rhythm: Normal rate.      Pulses: Normal pulses.   Pulmonary:      Effort: Pulmonary effort is normal. No respiratory distress.   Musculoskeletal:      Lumbar back: Spasms and tenderness present. No swelling, edema, deformity or bony tenderness. " Decreased range of motion.   Skin:     General: Skin is warm and dry.      Findings: No rash.   Neurological:      General: No focal deficit present.      Mental Status: She is alert and oriented to person, place, and time.   Psychiatric:         Mood and Affect: Mood normal.         Behavior: Behavior normal.       Assessment & Plan:     43 y.o. female with the following -     Problem List Items Addressed This Visit       Type 2 diabetes mellitus without complication, without long-term current use of insulin (HCC)     Chronic, improving. Currently taking metformin, semaglutide without negative se. Will increase semaglutide to 1 mg weekly.  Today uacr, monofilament, [id number]    Monofilament testing with a 10 gram force: sensation intact: intact bilaterally  Visual Inspection: Feet without maceration, ulcers, fissures.  Pedal pulses: intact bilaterally             Relevant Medications    Semaglutide, 1 MG/DOSE, 4 MG/3ML Solution Pen-injector    Other Relevant Orders    POCT A1C (Completed)    Microalbumin Creat Ratio Urine - Clinic Collect    Diabetic Monofilament Lower Extremity Exam (Completed)    Acute left-sided low back pain with left-sided sciatica     Acute, unstable. Reports onset yesterday when she stood up from the couch, experienced immediate sharp pain, radiating down right low back, buttock, and thigh. Denies n/t/w, or red flag symptoms.  Pain is worse with walking 8/10. Worse when moving from sitting to standing.  Has applied topical Voltaren, taken hot shower with some temporary relief of symptoms.   Will provide mobic 7.5 mg daily, tizanidine as needed for spasms, continue conservative treatment with ice, stretching, topical analgesia  Return if symptoms do not improve or worsen.         Relevant Medications    tizanidine (ZANAFLEX) 4 MG Tab    meloxicam (MOBIC) 7.5 MG Tab     Patient was educated in proper administration of medication(s) ordered today including safety, possible SE, risks, benefits,  rationale and alternatives to therapy.   Supportive care, differential diagnoses, and indications for immediate follow-up discussed with patient.    Pathogenesis of diagnosis discussed including typical length and natural progression.    Instructed to return to clinic or nearest emergency department for any change in condition, further concerns, or worsening of symptoms.  Patient states understanding of the plan of care and discharge instructions.    Return in about 4 weeks (around 5/31/2024), or if symptoms worsen or fail to improve.    Please note that this dictation was created using voice recognition software. I have made every reasonable attempt to correct obvious errors, but I expect that there are errors of grammar and possibly content that I did not discover before finalizing the note.

## 2024-05-03 NOTE — ASSESSMENT & PLAN NOTE
Chronic, improving. Currently taking metformin, semaglutide without negative se. Will increase semaglutide to 1 mg weekly.  Today uacr, monofilament, [id number]    Monofilament testing with a 10 gram force: sensation intact: intact bilaterally  Visual Inspection: Feet without maceration, ulcers, fissures.  Pedal pulses: intact bilaterally

## 2024-05-03 NOTE — ASSESSMENT & PLAN NOTE
Acute, unstable. Reports onset yesterday when she stood up from the couch, experienced immediate sharp pain, radiating down right low back, buttock, and thigh. Denies n/t/w, or red flag symptoms.  Pain is worse with walking 8/10. Worse when moving from sitting to standing.  Has applied topical Voltaren, taken hot shower with some temporary relief of symptoms.   Will provide mobic 7.5 mg daily, tizanidine as needed for spasms, continue conservative treatment with ice, stretching, topical analgesia  Return if symptoms do not improve or worsen.

## 2024-05-04 LAB
CREAT UR-MCNC: 258.79 MG/DL
MICROALBUMIN UR-MCNC: 2.8 MG/DL
MICROALBUMIN/CREAT UR: 11 MG/G (ref 0–30)

## 2024-05-14 ENCOUNTER — GYNECOLOGY VISIT (OUTPATIENT)
Dept: OBGYN | Facility: CLINIC | Age: 44
End: 2024-05-14
Payer: COMMERCIAL

## 2024-05-14 VITALS
SYSTOLIC BLOOD PRESSURE: 102 MMHG | HEIGHT: 63 IN | WEIGHT: 171.8 LBS | DIASTOLIC BLOOD PRESSURE: 66 MMHG | BODY MASS INDEX: 30.44 KG/M2

## 2024-05-14 DIAGNOSIS — N89.8 VAGINAL DRYNESS: ICD-10-CM

## 2024-05-14 DIAGNOSIS — N94.10 DYSPAREUNIA, FEMALE: ICD-10-CM

## 2024-05-14 PROCEDURE — 3078F DIAST BP <80 MM HG: CPT | Performed by: OBSTETRICS & GYNECOLOGY

## 2024-05-14 PROCEDURE — 3074F SYST BP LT 130 MM HG: CPT | Performed by: OBSTETRICS & GYNECOLOGY

## 2024-05-14 PROCEDURE — 99214 OFFICE O/P EST MOD 30 MIN: CPT | Performed by: OBSTETRICS & GYNECOLOGY

## 2024-05-14 RX ORDER — DROSPIRENONE 4 MG/1
TABLET, FILM COATED ORAL
COMMUNITY

## 2024-05-14 RX ORDER — FLUTICASONE FUROATE, UMECLIDINIUM BROMIDE AND VILANTEROL TRIFENATATE 200; 62.5; 25 UG/1; UG/1; UG/1
1 POWDER RESPIRATORY (INHALATION)
COMMUNITY

## 2024-05-14 RX ORDER — ESTRADIOL 10 UG/1
10 INSERT VAGINAL
Qty: 12 TABLET | Refills: 11 | Status: SHIPPED | OUTPATIENT
Start: 2024-05-14

## 2024-05-14 ASSESSMENT — FIBROSIS 4 INDEX: FIB4 SCORE: 1.1

## 2024-05-14 NOTE — PROGRESS NOTES
GYN follow up  LMP: Unknown   BC: OCps'  Last pap: 04/29/2022 Negative, Neg HPV  Good # [phone]
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
## 2024-05-20 ENCOUNTER — DOCUMENTATION (OUTPATIENT)
Dept: HEALTH INFORMATION MANAGEMENT | Facility: OTHER | Age: 44
End: 2024-05-20
Payer: COMMERCIAL

## 2024-05-31 ENCOUNTER — OFFICE VISIT (OUTPATIENT)
Dept: MEDICAL GROUP | Facility: PHYSICIAN GROUP | Age: 44
End: 2024-05-31
Payer: COMMERCIAL

## 2024-05-31 VITALS
TEMPERATURE: 98.4 F | WEIGHT: 168 LBS | RESPIRATION RATE: 14 BRPM | DIASTOLIC BLOOD PRESSURE: 60 MMHG | SYSTOLIC BLOOD PRESSURE: 116 MMHG | BODY MASS INDEX: 29.77 KG/M2 | HEIGHT: 63 IN | HEART RATE: 80 BPM | OXYGEN SATURATION: 100 %

## 2024-05-31 DIAGNOSIS — E11.9 DIABETES MELLITUS TYPE 2, NONINSULIN DEPENDENT (HCC): ICD-10-CM

## 2024-05-31 DIAGNOSIS — E11.65 TYPE 2 DIABETES MELLITUS WITH HYPERGLYCEMIA, WITHOUT LONG-TERM CURRENT USE OF INSULIN (HCC): ICD-10-CM

## 2024-05-31 DIAGNOSIS — M54.42 ACUTE LEFT-SIDED LOW BACK PAIN WITH LEFT-SIDED SCIATICA: ICD-10-CM

## 2024-05-31 DIAGNOSIS — G43.019 INTRACTABLE MIGRAINE WITHOUT AURA AND WITHOUT STATUS MIGRAINOSUS: ICD-10-CM

## 2024-05-31 DIAGNOSIS — Z23 NEED FOR VACCINATION: ICD-10-CM

## 2024-05-31 DIAGNOSIS — E28.2 PCOS (POLYCYSTIC OVARIAN SYNDROME): ICD-10-CM

## 2024-05-31 DIAGNOSIS — L21.9 SEBORRHEIC DERMATITIS OF SCALP: ICD-10-CM

## 2024-05-31 DIAGNOSIS — E78.5 DYSLIPIDEMIA: ICD-10-CM

## 2024-05-31 DIAGNOSIS — J45.40 MODERATE PERSISTENT ASTHMA WITHOUT COMPLICATION: ICD-10-CM

## 2024-05-31 PROCEDURE — 90715 TDAP VACCINE 7 YRS/> IM: CPT

## 2024-05-31 PROCEDURE — 90472 IMMUNIZATION ADMIN EACH ADD: CPT

## 2024-05-31 PROCEDURE — 90677 PCV20 VACCINE IM: CPT

## 2024-05-31 PROCEDURE — 99214 OFFICE O/P EST MOD 30 MIN: CPT | Mod: 25

## 2024-05-31 PROCEDURE — 90471 IMMUNIZATION ADMIN: CPT

## 2024-05-31 PROCEDURE — 90746 HEPB VACCINE 3 DOSE ADULT IM: CPT

## 2024-05-31 PROCEDURE — 3074F SYST BP LT 130 MM HG: CPT

## 2024-05-31 PROCEDURE — 3078F DIAST BP <80 MM HG: CPT

## 2024-05-31 RX ORDER — MELOXICAM 7.5 MG/1
7.5 TABLET ORAL DAILY
Qty: 30 TABLET | Refills: 3 | Status: SHIPPED | OUTPATIENT
Start: 2024-05-31

## 2024-05-31 RX ORDER — PROPRANOLOL HYDROCHLORIDE 20 MG/1
40 TABLET ORAL 2 TIMES DAILY
Qty: 360 TABLET | Refills: 3 | Status: SHIPPED | OUTPATIENT
Start: 2024-05-31

## 2024-05-31 RX ORDER — NARATRIPTAN 2.5 MG/1
2.5 TABLET ORAL
Qty: 20 TABLET | Refills: 3 | Status: SHIPPED | OUTPATIENT
Start: 2024-05-31

## 2024-05-31 RX ORDER — SPIRONOLACTONE 25 MG/1
25 TABLET ORAL 2 TIMES DAILY
Qty: 180 TABLET | Refills: 3 | Status: SHIPPED | OUTPATIENT
Start: 2024-05-31

## 2024-05-31 RX ORDER — METFORMIN HYDROCHLORIDE 500 MG/1
1000 TABLET, EXTENDED RELEASE ORAL 2 TIMES DAILY
Qty: 360 TABLET | Refills: 3 | Status: SHIPPED | OUTPATIENT
Start: 2024-05-31

## 2024-05-31 RX ORDER — ATORVASTATIN CALCIUM 20 MG/1
20 TABLET, FILM COATED ORAL NIGHTLY
Qty: 90 TABLET | Refills: 3 | Status: SHIPPED | OUTPATIENT
Start: 2024-05-31

## 2024-05-31 RX ORDER — CLOBETASOL PROPIONATE 0.5 MG/G
AEROSOL, FOAM TOPICAL
Qty: 100 G | Refills: 0 | Status: SHIPPED | OUTPATIENT
Start: 2024-05-31

## 2024-05-31 RX ORDER — ARIPIPRAZOLE 15 MG/1
TABLET ORAL
COMMUNITY
Start: 2024-04-27 | End: 2024-05-31

## 2024-05-31 RX ORDER — IPRATROPIUM BROMIDE 42 UG/1
SPRAY, METERED NASAL
COMMUNITY
Start: 2024-05-28

## 2024-05-31 RX ORDER — ARIPIPRAZOLE 10 MG/1
10 TABLET ORAL DAILY
COMMUNITY
Start: 2024-04-27

## 2024-05-31 ASSESSMENT — FIBROSIS 4 INDEX: FIB4 SCORE: 1.1

## 2024-05-31 ASSESSMENT — ENCOUNTER SYMPTOMS: BACK PAIN: 1

## 2024-05-31 NOTE — ASSESSMENT & PLAN NOTE
Ongoing, reports some improvement and less intense than previously.  Will try PT  Continue meloxicam 7.5 mg daily as needed.

## 2024-05-31 NOTE — PROGRESS NOTES
"Subjective:     CC: Diagnoses of Acute left-sided low back pain with left-sided sciatica, Seborrheic dermatitis of scalp, Dyslipidemia, Moderate persistent asthma without complication, Diabetes mellitus type 2, noninsulin dependent (HCC), Type 2 diabetes mellitus with hyperglycemia, without long-term current use of insulin (HCC), Intractable migraine without aura and without status migrainosus, PCOS (polycystic ovarian syndrome), and Need for vaccination were pertinent to this visit.    Pt presents today to follow up for back pain. Was seeing Dr. Lopez.    Reports back pain is improved slightly, but still persisting.   Discussed continuing conservative management, will try PT.    HPI:   Sherry presents today with    Problem   Acute Left-Sided Low Back Pain With Left-Sided Sciatica   Pcos (Polycystic Ovarian Syndrome)   Migraine Headache Without Aura   Asthma       ROS:  Review of Systems   Musculoskeletal:  Positive for back pain and joint pain (left hip/low back).   All other systems reviewed and are negative.      Objective:     Exam:  /60 (BP Location: Left arm, Patient Position: Sitting, BP Cuff Size: Adult)   Pulse 80   Temp 36.9 °C (98.4 °F) (Temporal)   Resp 14   Ht 1.6 m (5' 3\")   Wt 76.2 kg (168 lb)   LMP  (LMP Unknown)   SpO2 100%   BMI 29.76 kg/m²  Body mass index is 29.76 kg/m².    Physical Exam  Vitals reviewed.   Constitutional:       General: She is not in acute distress.     Appearance: Normal appearance. She is not ill-appearing.   HENT:      Head: Normocephalic and atraumatic.   Cardiovascular:      Rate and Rhythm: Normal rate.      Pulses: Normal pulses.   Pulmonary:      Effort: Pulmonary effort is normal. No respiratory distress.   Skin:     General: Skin is warm and dry.      Findings: No rash.   Neurological:      General: No focal deficit present.      Mental Status: She is alert and oriented to person, place, and time.   Psychiatric:         Mood and Affect: Mood normal.    "      Behavior: Behavior normal.       Assessment & Plan:     43 y.o. female with the following -     Problem List Items Addressed This Visit       PCOS (polycystic ovarian syndrome)     Chronic, continue spironolactone 25 mg BID         Relevant Medications    spironolactone (ALDACTONE) 25 MG Tab    Migraine headache without aura     Chronic, stable. Continue amerge daily as needed, propranolol 40 mg BID         Relevant Medications    meloxicam (MOBIC) 7.5 MG Tab    atorvastatin (LIPITOR) 20 MG Tab    naratriptan (AMERGE) 2.5 MG tablet    propranolol (INDERAL) 20 MG Tab    spironolactone (ALDACTONE) 25 MG Tab    Asthma     Chronic, stable. Using trelegy ellipta daily, albuterol as needed. Seeing allergy asthma for this.         Dyslipidemia    Relevant Medications    atorvastatin (LIPITOR) 20 MG Tab    Semaglutide, 2 MG/DOSE, 8 MG/3ML Solution Pen-injector    Acute left-sided low back pain with left-sided sciatica     Ongoing, reports some improvement and less intense than previously.  Will try PT  Continue meloxicam 7.5 mg daily as needed.         Relevant Medications    ARIPiprazole (ABILIFY) 10 MG Tab    meloxicam (MOBIC) 7.5 MG Tab    Other Relevant Orders    Referral to Physical Therapy     Other Visit Diagnoses       Seborrheic dermatitis of scalp        Relevant Medications    clobetasol (OLUX) 0.05 % foam    Diabetes mellitus type 2, noninsulin dependent (HCC)        Relevant Medications    metFORMIN ER (GLUCOPHAGE XR) 500 MG TABLET SR 24 HR    Semaglutide, 2 MG/DOSE, 8 MG/3ML Solution Pen-injector    Type 2 diabetes mellitus with hyperglycemia, without long-term current use of insulin (HCC)        Relevant Medications    metFORMIN ER (GLUCOPHAGE XR) 500 MG TABLET SR 24 HR    Semaglutide, 2 MG/DOSE, 8 MG/3ML Solution Pen-injector    Need for vaccination        Relevant Orders    Pneumococcal Conjugate Vaccine 20-Valent (6 wks+)    Hepatitis B Vaccine Adult 20+    Tdap Vaccine =>8YO IM          Patient was  educated in proper administration of medication(s) ordered today including safety, possible SE, risks, benefits, rationale and alternatives to therapy.   Supportive care, differential diagnoses, and indications for immediate follow-up discussed with patient.    Pathogenesis of diagnosis discussed including typical length and natural progression.    Instructed to return to clinic or nearest emergency department for any change in condition, further concerns, or worsening of symptoms.  Patient states understanding of the plan of care and discharge instructions.    Return in about 6 months (around 11/30/2024).    Please note that this dictation was created using voice recognition software. I have made every reasonable attempt to correct obvious errors, but I expect that there are errors of grammar and possibly content that I did not discover before finalizing the note.

## 2024-05-31 NOTE — ASSESSMENT & PLAN NOTE
Chronic, stable. Using trelegy ellipta daily, albuterol as needed. Seeing allergy asthma for this.

## 2024-06-10 ENCOUNTER — HOME STUDY (OUTPATIENT)
Dept: SLEEP MEDICINE | Facility: MEDICAL CENTER | Age: 44
End: 2024-06-10
Attending: STUDENT IN AN ORGANIZED HEALTH CARE EDUCATION/TRAINING PROGRAM
Payer: COMMERCIAL

## 2024-06-10 DIAGNOSIS — R06.83 LOUD SNORING: ICD-10-CM

## 2024-06-10 DIAGNOSIS — R40.0 DAYTIME SOMNOLENCE: ICD-10-CM

## 2024-06-10 DIAGNOSIS — R06.81 WITNESSED EPISODE OF APNEA: ICD-10-CM

## 2024-06-10 DIAGNOSIS — G47.30 SLEEP DISORDER BREATHING: ICD-10-CM

## 2024-06-10 DIAGNOSIS — Z91.89 RISK FACTORS FOR OBSTRUCTIVE SLEEP APNEA: ICD-10-CM

## 2024-06-10 PROCEDURE — 95800 SLP STDY UNATTENDED: CPT | Performed by: STUDENT IN AN ORGANIZED HEALTH CARE EDUCATION/TRAINING PROGRAM

## 2024-06-12 ENCOUNTER — OFFICE VISIT (OUTPATIENT)
Dept: SLEEP MEDICINE | Facility: MEDICAL CENTER | Age: 44
End: 2024-06-12
Attending: STUDENT IN AN ORGANIZED HEALTH CARE EDUCATION/TRAINING PROGRAM
Payer: COMMERCIAL

## 2024-06-12 VITALS
SYSTOLIC BLOOD PRESSURE: 118 MMHG | DIASTOLIC BLOOD PRESSURE: 76 MMHG | HEIGHT: 63 IN | BODY MASS INDEX: 28.37 KG/M2 | WEIGHT: 160.1 LBS | OXYGEN SATURATION: 96 % | HEART RATE: 94 BPM | RESPIRATION RATE: 16 BRPM

## 2024-06-12 DIAGNOSIS — G47.33 OSA (OBSTRUCTIVE SLEEP APNEA): Primary | ICD-10-CM

## 2024-06-12 DIAGNOSIS — R06.81 WITNESSED EPISODE OF APNEA: ICD-10-CM

## 2024-06-12 DIAGNOSIS — R40.0 DAYTIME SOMNOLENCE: ICD-10-CM

## 2024-06-12 DIAGNOSIS — F31.9 BIPOLAR 1 DISORDER (HCC): ICD-10-CM

## 2024-06-12 DIAGNOSIS — E11.9 TYPE 2 DIABETES MELLITUS WITHOUT COMPLICATION, WITHOUT LONG-TERM CURRENT USE OF INSULIN (HCC): ICD-10-CM

## 2024-06-12 PROCEDURE — 99213 OFFICE O/P EST LOW 20 MIN: CPT | Performed by: STUDENT IN AN ORGANIZED HEALTH CARE EDUCATION/TRAINING PROGRAM

## 2024-06-12 PROCEDURE — 3078F DIAST BP <80 MM HG: CPT | Performed by: STUDENT IN AN ORGANIZED HEALTH CARE EDUCATION/TRAINING PROGRAM

## 2024-06-12 PROCEDURE — 3074F SYST BP LT 130 MM HG: CPT | Performed by: STUDENT IN AN ORGANIZED HEALTH CARE EDUCATION/TRAINING PROGRAM

## 2024-06-12 ASSESSMENT — FIBROSIS 4 INDEX: FIB4 SCORE: 1.1

## 2024-06-12 NOTE — PROGRESS NOTES
[facility] Follow-up Visit    CC: Follow-up to discuss sleep study results      HPI:  Sherry Hatfield is a 43 y.o.female  with type 2 diabetes mellitus, PCOS, migraines, GERD, bipolar 1 disorder, asthma, and obstructive sleep apnea.  Presents today to follow-up regarding recent sleep study results.    She reports since last visit she continues have difficulty with not feeling rested daytime tiredness and energy.  She states that she recently lost her job due to her performance regarding her symptoms.  She states neither the sleep study went well.  He felt COVID a typical night sleep for her.    Sleep History  6/10/2024 HST WatchPAT study showed a pAHI of 9.1 events an hour, minimal oxygen saturation 89%    Patient Active Problem List    Diagnosis Date Noted    Acute left-sided low back pain with left-sided sciatica 05/03/2024    Chronic bilateral low back pain with right-sided sciatica 08/01/2023    Type 2 diabetes mellitus without complication, without long-term current use of insulin (HCC) 12/08/2022    Dyslipidemia 12/08/2022    PCOS (polycystic ovarian syndrome)     Migraine headache without aura     Asthma     Bipolar 1 disorder (HCC)     GERD (gastroesophageal reflux disease)        Past Medical History:   Diagnosis Date    Apnea, sleep     Asthma     Bipolar 1 disorder (HCC)     Daytime sleepiness     Gasping for breath     GERD (gastroesophageal reflux disease)     Insomnia     Migraine headache without aura     Morning headache     PCOS (polycystic ovarian syndrome)     Snoring         Past Surgical History:   Procedure Laterality Date    PRIMARY C SECTION      x2    TUBAL COAGULATION LAPAROSCOPIC BILATERAL         Family History   Problem Relation Age of Onset    GI Disease Mother         liver cirrhosis s/p transplant due to ETOH    Diabetes Mother     Cancer Mother         basal cell cancer    Alcohol abuse Mother     Heart Disease Father         CHF    GI Disease Father         early liver  cirrhosis    Alcohol abuse Father     Diabetes Father     Kidney Disease Father         ESRD on dialysis    Obesity Sister     Diabetes Sister     Alcohol abuse Sister     No Known Problems Brother        Social History     Socioeconomic History    Marital status:      Spouse name: Not on file    Number of children: 5    Years of education: Not on file    Highest education level: Not on file   Occupational History    Occupation:      Comment: [employer]   Tobacco Use    Smoking status: Never    Smokeless tobacco: Never   Vaping Use    Vaping status: Never Used   Substance and Sexual Activity    Alcohol use: Never    Drug use: Never    Sexual activity: Yes     Partners: Male     Birth control/protection: Pill   Other Topics Concern    Not on file   Social History Narrative    Not on file     Social Determinants of Health     Financial Resource Strain: Not on file   Food Insecurity: Not on file   Transportation Needs: Not on file   Physical Activity: Not on file   Stress: Not on file   Social Connections: Not on file   Intimate Partner Violence: Not on file   Housing Stability: Not on file       Current Outpatient Medications   Medication Sig Dispense Refill    ARIPiprazole (ABILIFY) 10 MG Tab Take 10 mg by mouth every day.      ipratropium (ATROVENT) 0.06 % Solution       meloxicam (MOBIC) 7.5 MG Tab Take 1 Tablet by mouth every day. 30 Tablet 3    clobetasol (OLUX) 0.05 % foam Apply to affected areas on the scalp twice daily 100 g 0    atorvastatin (LIPITOR) 20 MG Tab Take 1 Tablet by mouth every evening. 90 Tablet 3    metFORMIN ER (GLUCOPHAGE XR) 500 MG TABLET SR 24 HR Take 2 Tablets by mouth 2 times a day. 360 Tablet 3    naratriptan (AMERGE) 2.5 MG tablet Take 1 Tablet by mouth one time as needed for Migraine for up to 1 dose. 20 Tablet 3    propranolol (INDERAL) 20 MG Tab Take 2 Tablets by mouth 2 times a day. 360 Tablet 3    Semaglutide, 2 MG/DOSE, 8 MG/3ML Solution  Pen-injector Inject 2 mg under the skin every 7 days. 9 mL 3    spironolactone (ALDACTONE) 25 MG Tab Take 1 Tablet by mouth 2 times a day. 180 Tablet 3    fluticasone-umeclidinium-vilanterol (TRELEGY ELLIPTA) 200-62.5-25 mcg/act inhaler Inhale 1 Puff.      Drospirenone (SLYND) 4 MG Tab Take  by mouth.      VAGIFEM 10 MCG Insert 1 Tablet into the vagina 3 times a week. 12 Tablet 11    tizanidine (ZANAFLEX) 4 MG Tab Take 1 Tablet by mouth every 6 hours as needed (pain and spasm). 30 Tablet 3    Semaglutide, 1 MG/DOSE, 4 MG/3ML Solution Pen-injector Inject 1 mg under the skin every 7 days. 6 mL 3    diclofenac sodium (VOLTAREN) 1 % Gel Apply 4 g topically 4 times a day as needed (back/neck pain). 150 g 3    OXcarbazepine (TRILEPTAL) 300 MG Tab Take 300-600 mg by mouth 2 times a day. Taking 300mg in the AM and 600mg at night      rabeprazole (ACIPHEX) 20 MG tablet Take 20 mg by mouth every day.      azelastine (ASTELIN) 137 MCG/SPRAY nasal spray USE 1 SPRAY(S) IN EACH NOSTRIL TWICE DAILY, USE WITH FLUNISOLIDE NASLA SPRAY      Clobetasol Propionate Emulsion 0.05 % Foam AAA daily for 2 weeks then use as needed 100 g 1    tretinoin (RETIN-A) 0.05 % cream Apply  topically every evening. 45 g 5    buPROPion (WELLBUTRIN XL) 300 MG XL tablet       hydrOXYzine HCl (ATARAX) 25 MG Tab       QUEtiapine (SEROQUEL) 25 MG Tab TAKE 4 TABLETS BY MOUTH AT BEDTIME 120 Tablet 0    albuterol 108 (90 Base) MCG/ACT Aero Soln inhalation aerosol Inhale 2 Puffs every 6 hours as needed for Shortness of Breath.      triamcinolone acetonide (KENALOG) 0.1 % Cream Apply  topically 2 times a day.      Clobetasol Emul Foam w/MoistCr 0.05 % Kit Apply  topically.      flunisolide (NASALIDE) 25 MCG/ACT (0.025%) Solution Administer 2 Sprays into affected nostril(S) 2 times a day. 1 Each 5    ondansetron (ZOFRAN) 4 MG Tab tablet TAKE 1 TABLET BY MOUTH EVERY 6 HOURS AS NEEDED FOR NAUSEA AND VOMITING FOR UP TO 7 DAYS (Patient not taking: Reported on  "5/14/2024)      hydrocortisone 2.5 % Ointment Apply 1 Application topically 2 times a day. (Patient not taking: Reported on 5/14/2024) 30 g 2     No current facility-administered medications for this visit.        ALLERGIES: Morphine    ROS  Constitutional: Denies fevers, Denies weight changes  Ears/Nose/Throat/Mouth: Denies nasal congestion or sore throat   Cardiovascular: Denies chest pain  Respiratory: Denies shortness of breath, Denies cough  Gastrointestinal/Hepatic: Denies nausea, vomiting  Sleep: see HPI      PHYSICAL EXAM  /76 (BP Location: Left arm, Patient Position: Sitting, BP Cuff Size: Adult)   Pulse 94   Resp 16   Ht 1.6 m (5' 3\")   Wt 72.6 kg (160 lb 1.6 oz)   LMP  (LMP Unknown)   SpO2 96%   BMI 28.36 kg/m²   Appearance: Well-nourished, well-developed, no acute distress  Eyes:  No scleral icterus , EOMI  Musculoskeletal:  Grossly normal; gait and station normal; digits and nails normal  Skin:  No rashes, petechiae, cyanosis  Neurologic: without focal signs; oriented to person, time, place, and purpose; judgement intact      Medical Decision Making   Assessment and Plan  Sherry Hatfield is a 43 y.o.female  with type 2 diabetes mellitus, PCOS, migraines, GERD, bipolar 1 disorder, asthma, and obstructive sleep apnea.  Presents today to follow-up regarding recent sleep study results.    The medical record was reviewed.    Obstructive sleep apnea   Reviewed recent HST with patient showing an AHI of 9.1,  and Min Oxygen saturation of 89%.  Time at or below 88% saturation 0minutes  Based on sleep study and symptoms meets criteria for Mild obstructive sleep apnea.   We discussed the pathophysiology of obstructive sleep apnea (JOSE) and risk factors for the disease. We also discussed possible consequences of untreated JOSE, including excessive daytime sleepiness and fatigue, cognitive dysfunction, cardiovascular complications such as elevated blood pressure, . We discussed how JOSE typically " gets worse with age. We discussed treatment options for JOSE, including the gold standard therapy (PAP), alternative options such as a mandibular advancement device (custom-made oral appliances) and surgeries. We will proceed CPAP therapy.     RECOMMENDATIONS  -Start Auto CPAP at pressures 4-9 cm H2O  -Discussed importance of adherence/compliance   -Prescription generated for supplies   -Patient counseled to avoid driving when sleepy. Encouraged to anticipate sleepiness, consider taking a 10 min nap prior to driving, alternate with another , or pull over if sleepy while driving  -Advised to contact our office or myself with any questions via [employer]  -Follow up in 3 months or sooner if needed      Positive airway pressure will favorably impact many of the adverse conditions and effects provoked by JOSE.    Have advised the patient to follow up with the appropriate healthcare practitioners for all other medical problems and issues.    Return in about 3 months (around 9/12/2024).      Please note portions of this record was created using voice recognition software. I have made every reasonable attempt to correct obvious errors, but I expect that there are errors of grammar and possibly content I did not discover before finalizing the note.

## 2024-06-12 NOTE — PROCEDURES
DIAGNOSTIC HOME SLEEP TEST (HST) REPORT WatchPAT      PATIENT ID:  NAME:  Sherry Hatfield  MRN:               [id number]  YOB: 1980  DATE OF STUDY: 6/10/2024      Impression:     This study shows evidence of:      1. Mild obstructive sleep apnea with PAT apnea hypopnea index(pAHI) of 9.1 per hour.  PAT respiratory disturbance index (pRDI) was 9.1 per hour. These findings are based on 7 channels recording of PAT signal with sleep staging, heart rate, pulse oximetry, actigraphy, body position, snoring and respiratory movement.     2. Oxygenation O2 Sat. mean O2 sat was 93%,  janel was 89%,  and maximum O2 at 97 %. O2 sat was at or  below 88% for 0 min of evaluation time. Oxygen Desaturation (>=4%) Index was 1.2/hr. AVG HR was 86 BPM.      TECHNICAL DESCRIPTION: Patient underwent home sleep apnea testing with peripheral arterial tone signal (WatchPAT™). This is a Type IV portable monitor and device per Medicare. Monitoring was done with 7 channels recording of PAT signal with sleep staging, heart rate, pulse oximetry, actigraphy, body position, snoring and respiratory movement. Prior to using the device, the patient received verbal and written instructions for its application and was provided with the help desk phone number for additional telephonic instruction with 24-hour availability of qualified personnel to answer questions.    Respiratory events:      General sleep summary: . Total recording time is 9 hours and 57 minutes and total Sleep time is 9 hours and 15 minutes. The patient spent 468 minutes in the supine position and 88 minutes in the nonsupine position.      Recommendations:    1. CPAP titration study vs Auto CPAP trial.   2. In general patients with sleep apnea are advised to avoid alcohol and sedatives and to not operate a motor vehicle while drowsy. In some cases alternative treatment options may prove effective in resolving sleep apnea in these options include upper airway  surgery, the use of a dental orthotic or weight loss and positional therapy. Clinical correlation is required.         Jared Cortez MD

## 2024-07-01 ENCOUNTER — APPOINTMENT (OUTPATIENT)
Dept: MEDICAL GROUP | Facility: PHYSICIAN GROUP | Age: 44
End: 2024-07-01
Payer: COMMERCIAL

## 2024-07-02 ENCOUNTER — OFFICE VISIT (OUTPATIENT)
Dept: MEDICAL GROUP | Facility: PHYSICIAN GROUP | Age: 44
End: 2024-07-02
Payer: COMMERCIAL

## 2024-07-02 VITALS
HEIGHT: 63 IN | SYSTOLIC BLOOD PRESSURE: 112 MMHG | WEIGHT: 161 LBS | HEART RATE: 91 BPM | TEMPERATURE: 98.1 F | RESPIRATION RATE: 16 BRPM | DIASTOLIC BLOOD PRESSURE: 70 MMHG | BODY MASS INDEX: 28.53 KG/M2 | OXYGEN SATURATION: 97 %

## 2024-07-02 DIAGNOSIS — L60.0 INGROWN TOENAIL OF RIGHT FOOT: ICD-10-CM

## 2024-07-02 PROCEDURE — 3074F SYST BP LT 130 MM HG: CPT | Performed by: NURSE PRACTITIONER

## 2024-07-02 PROCEDURE — 3078F DIAST BP <80 MM HG: CPT | Performed by: NURSE PRACTITIONER

## 2024-07-02 PROCEDURE — 99213 OFFICE O/P EST LOW 20 MIN: CPT | Performed by: NURSE PRACTITIONER

## 2024-07-02 ASSESSMENT — FIBROSIS 4 INDEX: FIB4 SCORE: 1.13

## 2024-07-03 ENCOUNTER — APPOINTMENT (OUTPATIENT)
Dept: MEDICAL GROUP | Facility: PHYSICIAN GROUP | Age: 44
End: 2024-07-03
Payer: COMMERCIAL

## 2024-07-05 DIAGNOSIS — N94.10 DYSPAREUNIA, FEMALE: ICD-10-CM

## 2024-07-05 DIAGNOSIS — N89.8 VAGINAL DRYNESS: ICD-10-CM

## 2024-07-05 RX ORDER — CONJUGATED ESTROGENS 0.62 MG/G
0.5 CREAM VAGINAL
Qty: 1 EACH | Refills: 6 | Status: SHIPPED | OUTPATIENT
Start: 2024-07-05 | End: 2024-07-09

## 2024-07-07 DIAGNOSIS — N94.10 DYSPAREUNIA, FEMALE: ICD-10-CM

## 2024-07-07 DIAGNOSIS — N89.8 VAGINAL DRYNESS: ICD-10-CM

## 2024-07-09 RX ORDER — CONJUGATED ESTROGENS 0.62 MG/G
CREAM VAGINAL
Qty: 30 G | Refills: 6 | Status: SHIPPED | OUTPATIENT
Start: 2024-07-09

## 2024-07-22 ENCOUNTER — TELEPHONE (OUTPATIENT)
Dept: OBGYN | Facility: CLINIC | Age: 44
End: 2024-07-22
Payer: COMMERCIAL

## 2024-07-23 DIAGNOSIS — N89.8 VAGINAL DRYNESS: ICD-10-CM

## 2024-07-23 DIAGNOSIS — N94.10 DYSPAREUNIA, FEMALE: ICD-10-CM

## 2024-07-23 RX ORDER — ESTRADIOL 4 UG/1
1 INSERT VAGINAL DAILY
Qty: 8 EACH | Refills: 0 | Status: SHIPPED | OUTPATIENT
Start: 2024-07-23 | End: 2024-08-06

## 2024-07-23 RX ORDER — ESTRADIOL 4 UG/1
1 INSERT VAGINAL
Qty: 8 EACH | Refills: 11 | Status: SHIPPED | OUTPATIENT
Start: 2024-07-23

## 2024-08-23 ENCOUNTER — OFFICE VISIT (OUTPATIENT)
Dept: MEDICAL GROUP | Facility: PHYSICIAN GROUP | Age: 44
End: 2024-08-23
Payer: COMMERCIAL

## 2024-08-23 VITALS
HEART RATE: 89 BPM | RESPIRATION RATE: 18 BRPM | HEIGHT: 63 IN | TEMPERATURE: 97.1 F | OXYGEN SATURATION: 100 % | WEIGHT: 148 LBS | SYSTOLIC BLOOD PRESSURE: 122 MMHG | BODY MASS INDEX: 26.22 KG/M2 | DIASTOLIC BLOOD PRESSURE: 82 MMHG

## 2024-08-23 DIAGNOSIS — N89.8 VAGINAL DRYNESS: ICD-10-CM

## 2024-08-23 DIAGNOSIS — K21.9 GASTROESOPHAGEAL REFLUX DISEASE, UNSPECIFIED WHETHER ESOPHAGITIS PRESENT: ICD-10-CM

## 2024-08-23 DIAGNOSIS — N94.10 DYSPAREUNIA, FEMALE: ICD-10-CM

## 2024-08-23 DIAGNOSIS — M54.42 ACUTE LEFT-SIDED LOW BACK PAIN WITH LEFT-SIDED SCIATICA: ICD-10-CM

## 2024-08-23 DIAGNOSIS — G43.019 INTRACTABLE MIGRAINE WITHOUT AURA AND WITHOUT STATUS MIGRAINOSUS: ICD-10-CM

## 2024-08-23 PROCEDURE — 99214 OFFICE O/P EST MOD 30 MIN: CPT

## 2024-08-23 PROCEDURE — 3074F SYST BP LT 130 MM HG: CPT

## 2024-08-23 PROCEDURE — 3079F DIAST BP 80-89 MM HG: CPT

## 2024-08-23 RX ORDER — RABEPRAZOLE SODIUM 20 MG/1
20 TABLET, DELAYED RELEASE ORAL DAILY
Qty: 90 TABLET | Refills: 3 | Status: SHIPPED | OUTPATIENT
Start: 2024-08-23

## 2024-08-23 RX ORDER — DROSPIRENONE 4 MG/1
1 TABLET, FILM COATED ORAL DAILY
Qty: 84 TABLET | Refills: 1 | Status: SHIPPED | OUTPATIENT
Start: 2024-08-23

## 2024-08-23 RX ORDER — METHOCARBAMOL 750 MG/1
750 TABLET, FILM COATED ORAL 4 TIMES DAILY
Qty: 60 TABLET | Refills: 1 | Status: SHIPPED | OUTPATIENT
Start: 2024-08-23

## 2024-08-23 RX ORDER — NARATRIPTAN 2.5 MG/1
2.5 TABLET ORAL
Qty: 20 TABLET | Refills: 11 | Status: SHIPPED | OUTPATIENT
Start: 2024-08-23

## 2024-08-23 RX ORDER — CELECOXIB 100 MG/1
100 CAPSULE ORAL 2 TIMES DAILY
Qty: 60 CAPSULE | Refills: 1 | Status: SHIPPED | OUTPATIENT
Start: 2024-08-23

## 2024-08-23 ASSESSMENT — FIBROSIS 4 INDEX: FIB4 SCORE: 1.13

## 2024-08-23 ASSESSMENT — ENCOUNTER SYMPTOMS: BACK PAIN: 1

## 2024-08-23 NOTE — ASSESSMENT & PLAN NOTE
Acute, improving. Reports some improvement in pain, unable to tolerate meloxicam due to GI side effect, tizanidine is effective at night but does cause drowsiness.  Will try celebrex as needed  Methocarbamol as needed  Has not tried PT yet, but reports symptoms are getting a little better slowly.

## 2024-08-23 NOTE — PROGRESS NOTES
"Subjective:     CC: Diagnoses of Acute left-sided low back pain with left-sided sciatica, Gastroesophageal reflux disease, unspecified whether esophagitis present, Vaginal dryness, Dyspareunia, female, and Intractable migraine without aura and without status migrainosus were pertinent to this visit.    Pt presents today.   HPI:   Sherry presents today with    Problem   Acute Left-Sided Low Back Pain With Left-Sided Sciatica     ROS:  Review of Systems   Musculoskeletal:  Positive for back pain.   All other systems reviewed and are negative.      Objective:     Exam:  /82 (BP Location: Right arm, Patient Position: Sitting, BP Cuff Size: Adult)   Pulse 89   Temp 36.2 °C (97.1 °F) (Temporal)   Resp 18   Ht 1.6 m (5' 3\")   Wt 67.1 kg (148 lb)   SpO2 100%   BMI 26.22 kg/m²  Body mass index is 26.22 kg/m².    Physical Exam  Vitals reviewed.   Constitutional:       General: She is not in acute distress.     Appearance: Normal appearance. She is not ill-appearing.   HENT:      Head: Normocephalic and atraumatic.   Cardiovascular:      Rate and Rhythm: Normal rate.      Pulses: Normal pulses.   Pulmonary:      Effort: Pulmonary effort is normal. No respiratory distress.   Skin:     General: Skin is warm and dry.      Findings: No rash.   Neurological:      General: No focal deficit present.      Mental Status: She is alert and oriented to person, place, and time.   Psychiatric:         Mood and Affect: Mood normal.         Behavior: Behavior normal.       Assessment & Plan:     44 y.o. female with the following -     Problem List Items Addressed This Visit       Migraine headache without aura    Relevant Medications    methocarbamol (ROBAXIN) 750 MG Tab    celecoxib (CELEBREX) 100 MG Cap    naratriptan (AMERGE) 2.5 MG tablet    GERD (gastroesophageal reflux disease)    Relevant Medications    rabeprazole (ACIPHEX) 20 MG tablet    Acute left-sided low back pain with left-sided sciatica     Acute, improving. " Reports some improvement in pain, unable to tolerate meloxicam due to GI side effect, tizanidine is effective at night but does cause drowsiness.  Will try celebrex as needed  Methocarbamol as needed  Has not tried PT yet, but reports symptoms are getting a little better slowly.         Relevant Medications    methocarbamol (ROBAXIN) 750 MG Tab    celecoxib (CELEBREX) 100 MG Cap     Other Visit Diagnoses       Vaginal dryness        Dyspareunia, female              Patient was educated in proper administration of medication(s) ordered today including safety, possible SE, risks, benefits, rationale and alternatives to therapy.   Supportive care, differential diagnoses, and indications for immediate follow-up discussed with patient.    Pathogenesis of diagnosis discussed including typical length and natural progression.    Instructed to return to clinic or nearest emergency department for any change in condition, further concerns, or worsening of symptoms.  Patient states understanding of the plan of care and discharge instructions.    Return in about 7 weeks (around 10/11/2024), or if symptoms worsen or fail to improve.    Please note that this dictation was created using voice recognition software. I have made every reasonable attempt to correct obvious errors, but I expect that there are errors of grammar and possibly content that I did not discover before finalizing the note.

## 2024-09-17 DIAGNOSIS — E55.9 VITAMIN D DEFICIENCY: ICD-10-CM

## 2024-09-17 DIAGNOSIS — E11.9 TYPE 2 DIABETES MELLITUS WITHOUT COMPLICATION, WITHOUT LONG-TERM CURRENT USE OF INSULIN (HCC): ICD-10-CM

## 2024-09-17 DIAGNOSIS — E78.5 DYSLIPIDEMIA: ICD-10-CM

## 2024-09-17 DIAGNOSIS — Z13.0 SCREENING FOR DEFICIENCY ANEMIA: ICD-10-CM

## 2024-09-17 DIAGNOSIS — Z13.29 THYROID DISORDER SCREEN: ICD-10-CM

## 2024-09-18 ENCOUNTER — APPOINTMENT (OUTPATIENT)
Dept: SLEEP MEDICINE | Facility: MEDICAL CENTER | Age: 44
End: 2024-09-18
Attending: STUDENT IN AN ORGANIZED HEALTH CARE EDUCATION/TRAINING PROGRAM
Payer: COMMERCIAL

## 2024-09-30 ENCOUNTER — PATIENT MESSAGE (OUTPATIENT)
Dept: MEDICAL GROUP | Facility: PHYSICIAN GROUP | Age: 44
End: 2024-09-30
Payer: COMMERCIAL

## 2024-09-30 DIAGNOSIS — M54.42 ACUTE LEFT-SIDED LOW BACK PAIN WITH LEFT-SIDED SCIATICA: ICD-10-CM

## 2024-09-30 RX ORDER — CELECOXIB 100 MG/1
100 CAPSULE ORAL 2 TIMES DAILY
Qty: 60 CAPSULE | Refills: 0 | Status: SHIPPED | OUTPATIENT
Start: 2024-09-30

## 2024-09-30 RX ORDER — METHOCARBAMOL 750 MG/1
750 TABLET, FILM COATED ORAL 4 TIMES DAILY
Qty: 60 TABLET | Refills: 0 | Status: SHIPPED | OUTPATIENT
Start: 2024-09-30

## 2024-09-30 NOTE — PATIENT COMMUNICATION
Received request via: Patient    Was the patient seen in the last year in this department? Yes    Does the patient have an active prescription (recently filled or refills available) for medication(s) requested? No    Pharmacy Name: Pharmacy:   [id number] - [facility] [place] - [address] [place] [address]     Does the patient have senior living Plus and need 100-day supply? (This applies to ALL medications) Patient does not have SCP

## 2024-10-11 ENCOUNTER — OFFICE VISIT (OUTPATIENT)
Dept: MEDICAL GROUP | Facility: PHYSICIAN GROUP | Age: 44
End: 2024-10-11
Payer: COMMERCIAL

## 2024-10-11 VITALS
TEMPERATURE: 97.1 F | OXYGEN SATURATION: 95 % | HEIGHT: 63 IN | HEART RATE: 91 BPM | SYSTOLIC BLOOD PRESSURE: 108 MMHG | BODY MASS INDEX: 25.34 KG/M2 | WEIGHT: 143 LBS | DIASTOLIC BLOOD PRESSURE: 72 MMHG

## 2024-10-11 DIAGNOSIS — M54.42 ACUTE LEFT-SIDED LOW BACK PAIN WITH LEFT-SIDED SCIATICA: ICD-10-CM

## 2024-10-11 DIAGNOSIS — M54.41 CHRONIC BILATERAL LOW BACK PAIN WITH RIGHT-SIDED SCIATICA: ICD-10-CM

## 2024-10-11 DIAGNOSIS — E55.9 VITAMIN D DEFICIENCY: ICD-10-CM

## 2024-10-11 DIAGNOSIS — M54.2 ACUTE NECK PAIN: ICD-10-CM

## 2024-10-11 DIAGNOSIS — Z13.6 SCREENING FOR CARDIOVASCULAR CONDITION: ICD-10-CM

## 2024-10-11 DIAGNOSIS — Z13.29 THYROID DISORDER SCREEN: ICD-10-CM

## 2024-10-11 DIAGNOSIS — Z13.0 SCREENING FOR DEFICIENCY ANEMIA: ICD-10-CM

## 2024-10-11 DIAGNOSIS — Z23 NEED FOR VACCINATION: ICD-10-CM

## 2024-10-11 DIAGNOSIS — G89.29 CHRONIC BILATERAL LOW BACK PAIN WITH RIGHT-SIDED SCIATICA: ICD-10-CM

## 2024-10-11 DIAGNOSIS — Z11.59 NEED FOR HEPATITIS C SCREENING TEST: ICD-10-CM

## 2024-10-11 PROCEDURE — 90471 IMMUNIZATION ADMIN: CPT

## 2024-10-11 PROCEDURE — 90746 HEPB VACCINE 3 DOSE ADULT IM: CPT

## 2024-10-11 PROCEDURE — 90472 IMMUNIZATION ADMIN EACH ADD: CPT

## 2024-10-11 PROCEDURE — 90656 IIV3 VACC NO PRSV 0.5 ML IM: CPT

## 2024-10-11 PROCEDURE — 3078F DIAST BP <80 MM HG: CPT

## 2024-10-11 PROCEDURE — 99214 OFFICE O/P EST MOD 30 MIN: CPT | Mod: 25

## 2024-10-11 PROCEDURE — 3074F SYST BP LT 130 MM HG: CPT

## 2024-10-11 RX ORDER — CELECOXIB 200 MG/1
200 CAPSULE ORAL 2 TIMES DAILY
Qty: 180 CAPSULE | Refills: 1 | Status: SHIPPED | OUTPATIENT
Start: 2024-10-11

## 2024-10-11 RX ORDER — METHOCARBAMOL 750 MG/1
750 TABLET, FILM COATED ORAL 4 TIMES DAILY
Qty: 180 TABLET | Refills: 3 | Status: SHIPPED | OUTPATIENT
Start: 2024-10-11

## 2024-10-11 ASSESSMENT — ENCOUNTER SYMPTOMS
BACK PAIN: 1
NECK PAIN: 1

## 2024-10-11 ASSESSMENT — FIBROSIS 4 INDEX: FIB4 SCORE: 1.13

## 2024-12-26 RX ORDER — DROSPIRENONE 4 MG/1
1 TABLET, FILM COATED ORAL DAILY
Qty: 84 TABLET | Refills: 4 | Status: SHIPPED | OUTPATIENT
Start: 2024-12-26

## 2024-12-26 NOTE — TELEPHONE ENCOUNTER
Received request via: Pharmacy    Was the patient seen in the last year in this department? Yes    Does the patient have an active prescription (recently filled or refills available) for medication(s) requested? No    Pharmacy Name: [facility]    Does the patient have custodial Plus and need 100-day supply? (This applies to ALL medications) Patient does not have SCP

## 2025-01-10 ENCOUNTER — APPOINTMENT (OUTPATIENT)
Dept: MEDICAL GROUP | Facility: PHYSICIAN GROUP | Age: 45
End: 2025-01-10
Payer: COMMERCIAL

## 2025-02-15 ENCOUNTER — HOSPITAL ENCOUNTER (OUTPATIENT)
Dept: LAB | Facility: MEDICAL CENTER | Age: 45
End: 2025-02-15
Payer: COMMERCIAL

## 2025-02-15 DIAGNOSIS — Z11.59 NEED FOR HEPATITIS C SCREENING TEST: ICD-10-CM

## 2025-02-15 DIAGNOSIS — Z13.29 THYROID DISORDER SCREEN: ICD-10-CM

## 2025-02-15 DIAGNOSIS — Z13.0 SCREENING FOR DEFICIENCY ANEMIA: ICD-10-CM

## 2025-02-15 DIAGNOSIS — Z13.6 SCREENING FOR CARDIOVASCULAR CONDITION: ICD-10-CM

## 2025-02-15 DIAGNOSIS — E55.9 VITAMIN D DEFICIENCY: ICD-10-CM

## 2025-02-15 LAB
25(OH)D3 SERPL-MCNC: 13 NG/ML (ref 30–100)
ALBUMIN SERPL BCP-MCNC: 4.4 G/DL (ref 3.2–4.9)
ALBUMIN/GLOB SERPL: 1.6 G/DL
ALP SERPL-CCNC: 56 U/L (ref 30–99)
ALT SERPL-CCNC: 16 U/L (ref 2–50)
ANION GAP SERPL CALC-SCNC: 11 MMOL/L (ref 7–16)
AST SERPL-CCNC: 22 U/L (ref 12–45)
BILIRUB SERPL-MCNC: 0.2 MG/DL (ref 0.1–1.5)
BUN SERPL-MCNC: 10 MG/DL (ref 8–22)
CALCIUM ALBUM COR SERPL-MCNC: 9.3 MG/DL (ref 8.5–10.5)
CALCIUM SERPL-MCNC: 9.6 MG/DL (ref 8.5–10.5)
CHLORIDE SERPL-SCNC: 107 MMOL/L (ref 96–112)
CHOLEST SERPL-MCNC: 107 MG/DL (ref 100–199)
CO2 SERPL-SCNC: 22 MMOL/L (ref 20–33)
CREAT SERPL-MCNC: 0.59 MG/DL (ref 0.5–1.4)
ERYTHROCYTE [DISTWIDTH] IN BLOOD BY AUTOMATED COUNT: 41.5 FL (ref 35.9–50)
EST. AVERAGE GLUCOSE BLD GHB EST-MCNC: 100 MG/DL
GFR SERPLBLD CREATININE-BSD FMLA CKD-EPI: 113 ML/MIN/1.73 M 2
GLOBULIN SER CALC-MCNC: 2.7 G/DL (ref 1.9–3.5)
GLUCOSE SERPL-MCNC: 84 MG/DL (ref 65–99)
HBA1C MFR BLD: 5.1 % (ref 4–5.6)
HCT VFR BLD AUTO: 38.5 % (ref 37–47)
HCV AB SER QL: NORMAL
HDLC SERPL-MCNC: 46 MG/DL
HGB BLD-MCNC: 12.6 G/DL (ref 12–16)
LDLC SERPL CALC-MCNC: 48 MG/DL
MCH RBC QN AUTO: 29.9 PG (ref 27–33)
MCHC RBC AUTO-ENTMCNC: 32.7 G/DL (ref 32.2–35.5)
MCV RBC AUTO: 91.2 FL (ref 81.4–97.8)
PLATELET # BLD AUTO: 375 K/UL (ref 164–446)
PMV BLD AUTO: 9.6 FL (ref 9–12.9)
POTASSIUM SERPL-SCNC: 4.2 MMOL/L (ref 3.6–5.5)
PROT SERPL-MCNC: 7.1 G/DL (ref 6–8.2)
RBC # BLD AUTO: 4.22 M/UL (ref 4.2–5.4)
SODIUM SERPL-SCNC: 140 MMOL/L (ref 135–145)
TRIGL SERPL-MCNC: 64 MG/DL (ref 0–149)
TSH SERPL-ACNC: 0.81 UIU/ML (ref 0.35–5.5)
WBC # BLD AUTO: 9.5 K/UL (ref 4.8–10.8)

## 2025-02-15 PROCEDURE — 85027 COMPLETE CBC AUTOMATED: CPT

## 2025-02-15 PROCEDURE — 86803 HEPATITIS C AB TEST: CPT

## 2025-02-15 PROCEDURE — 80053 COMPREHEN METABOLIC PANEL: CPT

## 2025-02-15 PROCEDURE — 82306 VITAMIN D 25 HYDROXY: CPT

## 2025-02-15 PROCEDURE — 83036 HEMOGLOBIN GLYCOSYLATED A1C: CPT

## 2025-02-15 PROCEDURE — 36415 COLL VENOUS BLD VENIPUNCTURE: CPT

## 2025-02-15 PROCEDURE — 84443 ASSAY THYROID STIM HORMONE: CPT

## 2025-02-15 PROCEDURE — 80061 LIPID PANEL: CPT

## 2025-02-18 ENCOUNTER — RESULTS FOLLOW-UP (OUTPATIENT)
Dept: MEDICAL GROUP | Facility: PHYSICIAN GROUP | Age: 45
End: 2025-02-18
Payer: COMMERCIAL

## 2025-03-24 ENCOUNTER — TELEPHONE (OUTPATIENT)
Dept: OBGYN | Facility: CLINIC | Age: 45
End: 2025-03-24
Payer: COMMERCIAL

## 2025-03-24 NOTE — TELEPHONE ENCOUNTER
"Contacted pt and spoke with her regarding the PA for Premarin. Informed pt that we received a notification from Pharmacy that needs a PA.  I asked pt if she was still using Imvexxy the alternative medication prescribed by Dr. Jarvis covered by her insurance. Pt states Imvexxy is not working for her, stated that it helped some with the itchiness but it did not help with the pain with intercourse. Pt stated \"It feels like glass is reaping inside\"   Pt stated she is aware her insurance does not covered Premarin but she needs to save her marriage and she is willing to pay out of pocket. Pt stated Premarin out pocket cost is like $500.00 dollars but with [facility] is $250.00 dollars. I told pt that I will submit the PA to see if it can get approved since she already tried several other alternatives and they have failed. I asked pt to go over with me on the  medications she has tried and why those didn't work as most likely those questions will be asked when contacting the PA insurance.   Pt stated she has tried Estradiol vagina cream and it made her symptoms worse. It did not helped the inside of vagina was tearing. Pt stated Premarin has been the only medications that has helped with her symptoms .     I called [facility] and spoke with Gely FRANCES PA Representative. I informed Gely the above who helped me process the PA over the phone. Gely stated that she will send the PA to the pharmacy for review and it may take 1 to 24 hours for an answer.    Received notification from [facility] that request for coverage for Premarin was approved.  Called Pt's [facility] pharmacy to re-run the Rx  Pt was informed of her Rx approval was very happy and thankful.    "

## 2025-04-03 ENCOUNTER — APPOINTMENT (OUTPATIENT)
Dept: OBGYN | Facility: CLINIC | Age: 45
End: 2025-04-03
Payer: COMMERCIAL

## 2025-04-03 VITALS — SYSTOLIC BLOOD PRESSURE: 105 MMHG | WEIGHT: 132 LBS | BODY MASS INDEX: 23.38 KG/M2 | DIASTOLIC BLOOD PRESSURE: 65 MMHG

## 2025-04-03 DIAGNOSIS — Z12.31 ENCOUNTER FOR SCREENING MAMMOGRAM FOR MALIGNANT NEOPLASM OF BREAST: ICD-10-CM

## 2025-04-03 DIAGNOSIS — N94.819 VULVODYNIA: ICD-10-CM

## 2025-04-03 PROCEDURE — 3078F DIAST BP <80 MM HG: CPT | Performed by: FAMILY MEDICINE

## 2025-04-03 PROCEDURE — 99212 OFFICE O/P EST SF 10 MIN: CPT | Performed by: FAMILY MEDICINE

## 2025-04-03 PROCEDURE — 3074F SYST BP LT 130 MM HG: CPT | Performed by: FAMILY MEDICINE

## 2025-04-03 ASSESSMENT — FIBROSIS 4 INDEX: FIB4 SCORE: 0.65

## 2025-04-03 NOTE — PROGRESS NOTES
Pt here for gyn appt  Cc Perimenopuasal symptoms ( painful intercourse abnd vaginal dryness)  Phone # verified   Pharmacy verified

## 2025-04-04 DIAGNOSIS — E78.5 DYSLIPIDEMIA: ICD-10-CM

## 2025-04-04 DIAGNOSIS — E11.65 TYPE 2 DIABETES MELLITUS WITH HYPERGLYCEMIA, WITHOUT LONG-TERM CURRENT USE OF INSULIN (HCC): ICD-10-CM

## 2025-04-04 RX ORDER — SEMAGLUTIDE 2.68 MG/ML
2 INJECTION, SOLUTION SUBCUTANEOUS
Qty: 9 ML | Refills: 0 | Status: SHIPPED | OUTPATIENT
Start: 2025-04-04

## 2025-04-04 NOTE — Clinical Note
REFERRAL APPROVAL NOTICE         Sent on April 4, 2025                   Sherry Hatfield  [address]  [place] [address]                   Dear Ms. Hatfield,    After a careful review of the medical information and benefit coverage, [facility] has processed your referral. See below for additional details.    If applicable, you must be actively enrolled with your insurance for coverage of the authorized service. If you have any questions regarding your coverage, please contact your insurance directly.    REFERRAL INFORMATION   Referral #:  [id number]  Referred-To Department    Referred-By Provider:  Gynecology    Yary Small M.D.   [facility]      [address]  [place] [address]  [phone] [address]  [place] [id number] [phone]    Referral Start Date:  04/03/2025  Referral End Date:   04/03/2026             SCHEDULING  If you do not already have an appointment, please call [phone] to make an appointment.     MORE INFORMATION  If you do not already have a [employer] account, sign up at: [email or web address]  You can access your medical information, make appointments, see lab results, billing information, and more.  If you have questions regarding this referral, please contact  the [facility] Referrals department at:             [phone]. Monday - Friday 8:00AM - 5:00PM.     Sincerely,    [facility]
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
## 2025-04-16 ENCOUNTER — TELEPHONE (OUTPATIENT)
Dept: OBGYN | Facility: CLINIC | Age: 45
End: 2025-04-16
Payer: COMMERCIAL

## 2025-04-16 NOTE — TELEPHONE ENCOUNTER
VOICEMAIL  1. Caller Name: Sherry Hatfield                         Call Back Number: [phone]     2. Message: Called pt to get her rescheduled. Vm and call back number was left.    3. Patient approves office to leave a detailed voicemail/MyChart message: N\A   Constricted

## 2025-04-18 ENCOUNTER — GYNECOLOGY VISIT (OUTPATIENT)
Dept: GYNECOLOGY | Facility: CLINIC | Age: 45
End: 2025-04-18
Payer: COMMERCIAL

## 2025-04-18 VITALS
HEIGHT: 63 IN | DIASTOLIC BLOOD PRESSURE: 91 MMHG | BODY MASS INDEX: 22.96 KG/M2 | WEIGHT: 129.6 LBS | SYSTOLIC BLOOD PRESSURE: 134 MMHG

## 2025-04-18 DIAGNOSIS — N94.819 VULVODYNIA: ICD-10-CM

## 2025-04-18 PROCEDURE — 99459 PELVIC EXAMINATION: CPT | Performed by: STUDENT IN AN ORGANIZED HEALTH CARE EDUCATION/TRAINING PROGRAM

## 2025-04-18 PROCEDURE — 99204 OFFICE O/P NEW MOD 45 MIN: CPT | Performed by: STUDENT IN AN ORGANIZED HEALTH CARE EDUCATION/TRAINING PROGRAM

## 2025-04-18 PROCEDURE — 3075F SYST BP GE 130 - 139MM HG: CPT | Performed by: STUDENT IN AN ORGANIZED HEALTH CARE EDUCATION/TRAINING PROGRAM

## 2025-04-18 PROCEDURE — 3080F DIAST BP >= 90 MM HG: CPT | Performed by: STUDENT IN AN ORGANIZED HEALTH CARE EDUCATION/TRAINING PROGRAM

## 2025-04-18 RX ORDER — GABAPENTIN 100 MG/1
100 CAPSULE ORAL
Qty: 30 CAPSULE | Refills: 3 | Status: SHIPPED | OUTPATIENT
Start: 2025-04-18 | End: 2025-04-18

## 2025-04-18 RX ORDER — GABAPENTIN 100 MG/1
100 CAPSULE ORAL
Qty: 30 CAPSULE | Refills: 3 | Status: SHIPPED | OUTPATIENT
Start: 2025-04-18

## 2025-04-18 ASSESSMENT — FIBROSIS 4 INDEX: FIB4 SCORE: 0.65

## 2025-04-18 NOTE — PATIENT INSTRUCTIONS
Preferred [place] Pelvic Physical therapists:     [facility]:   Jazmin Thompson DPT,  Asmita Gonzalez DPT, Glenys Sauer DPT, Venecia Quinn DPT  [address], [place] [address]  [phone]  [email or web address]  Insurance: [place], Senior care plus. Cash pay ~$150  [facility]   Tatianaluciana Man DPT, Debra Tomlin DPT, Isha Crawley DPT  [address]    Phone: [phone]   [email or web address]   Insurance: Most commercial, Medicare, Medicaid (Medicaid referral must state weekly frequency and specific number of visits)  [facility]  Martina Jackson  [address], [place], [place], [address]  Phone: [phone]   Fax:[phone]   [email or web address]  Insurance: Medicare, most commercial, (peding medicaid/) cash pay $125 (new) $100 (f/u)  [facility]  Aislinn Zamarripa  [address], [address], [place], [place], [address]  [phone]  Send referral to “Occupational Therapy” and specify Pelvic OT in the comments.   Matt Haas PT  Chantale Radford,  DPT  [facility]   [address]  [place]. [address]   Phone: [phone]   Fax: [phone]   Insurance: Most commercial, medicare  [facility]:  Leana Murillo DPT  [address] [address]  Phone: [phone]  [email or web address]  Cash Pay, sliding scale  [facility]  Kala Dutton MPT  [address], [place] [address]  Phone: [phone]  [email or web address]  Most insurers including

## 2025-04-18 NOTE — PROGRESS NOTES
PT here today for consult   UTI Sx: none  Ref for volvodynia  Hysterectomy: no  Last pap: 04/29/2022 MAEGAN  Good #: [phone]   PVR :  20 mL   Pharmacy Verified
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
## 2025-04-21 ENCOUNTER — APPOINTMENT (OUTPATIENT)
Dept: RADIOLOGY | Facility: MEDICAL CENTER | Age: 45
End: 2025-04-21
Attending: FAMILY MEDICINE
Payer: COMMERCIAL

## 2025-04-21 NOTE — Clinical Note
REFERRAL APPROVAL NOTICE         Sent on April 21, 2025                   Sherry Hatfield  [address]  [place] [address]                   Dear Ms. Hatfield,    After a careful review of the medical information and benefit coverage, [facility] has processed your referral. See below for additional details.    If applicable, you must be actively enrolled with your insurance for coverage of the authorized service. If you have any questions regarding your coverage, please contact your insurance directly.    REFERRAL INFORMATION   Referral #:  [id number]  Referred-To Provider    Referred-By Provider:  Physical Therapy    Meredith Kerr M.D.   [facility] ORTHOPAEDIC PHYSICAL THERAPY [facility]      [address]  [address]  [place] [address]  [phone] [address]  [place] [address]  [phone]    Referral Start Date:  04/18/2025  Referral End Date:   04/18/2026             SCHEDULING  If you do not already have an appointment, please call [phone] to make an appointment.     MORE INFORMATION  If you do not already have a [employer] account, sign up at: [email or web address]  You can access your medical information, make appointments, see lab results, billing information, and more.  If you have questions regarding this referral, please contact  the [facility] Referrals department at:             [phone]. Monday - Friday 8:00AM - 5:00PM.     Sincerely,    [facility]

## 2025-05-07 ENCOUNTER — APPOINTMENT (OUTPATIENT)
Dept: MEDICAL GROUP | Facility: PHYSICIAN GROUP | Age: 45
End: 2025-05-07
Payer: COMMERCIAL

## 2025-05-09 ENCOUNTER — OFFICE VISIT (OUTPATIENT)
Dept: MEDICAL GROUP | Facility: PHYSICIAN GROUP | Age: 45
End: 2025-05-09
Payer: COMMERCIAL

## 2025-05-09 ENCOUNTER — HOSPITAL ENCOUNTER (OUTPATIENT)
Facility: MEDICAL CENTER | Age: 45
End: 2025-05-09
Payer: COMMERCIAL

## 2025-05-09 VITALS
RESPIRATION RATE: 16 BRPM | BODY MASS INDEX: 21.97 KG/M2 | SYSTOLIC BLOOD PRESSURE: 102 MMHG | TEMPERATURE: 97.7 F | WEIGHT: 124 LBS | OXYGEN SATURATION: 98 % | HEIGHT: 63 IN | HEART RATE: 88 BPM | DIASTOLIC BLOOD PRESSURE: 62 MMHG

## 2025-05-09 DIAGNOSIS — L70.0 ACNE VULGARIS: ICD-10-CM

## 2025-05-09 DIAGNOSIS — E78.5 DYSLIPIDEMIA: ICD-10-CM

## 2025-05-09 DIAGNOSIS — L21.9 SEBORRHEIC DERMATITIS OF SCALP: ICD-10-CM

## 2025-05-09 DIAGNOSIS — E11.65 TYPE 2 DIABETES MELLITUS WITH HYPERGLYCEMIA, WITHOUT LONG-TERM CURRENT USE OF INSULIN (HCC): ICD-10-CM

## 2025-05-09 DIAGNOSIS — E11.9 TYPE 2 DIABETES MELLITUS WITHOUT COMPLICATION, WITHOUT LONG-TERM CURRENT USE OF INSULIN (HCC): ICD-10-CM

## 2025-05-09 DIAGNOSIS — E11.9 DIABETES MELLITUS TYPE 2, NONINSULIN DEPENDENT (HCC): ICD-10-CM

## 2025-05-09 DIAGNOSIS — N94.819 VULVODYNIA: ICD-10-CM

## 2025-05-09 DIAGNOSIS — E55.9 VITAMIN D DEFICIENCY: ICD-10-CM

## 2025-05-09 DIAGNOSIS — Z23 NEED FOR VACCINATION: ICD-10-CM

## 2025-05-09 LAB
CREAT UR-MCNC: 120 MG/DL
MICROALBUMIN UR-MCNC: 2.5 MG/DL
MICROALBUMIN/CREAT UR: 21 MG/G (ref 0–30)

## 2025-05-09 PROCEDURE — 99214 OFFICE O/P EST MOD 30 MIN: CPT | Mod: 25

## 2025-05-09 PROCEDURE — 3078F DIAST BP <80 MM HG: CPT

## 2025-05-09 PROCEDURE — 90471 IMMUNIZATION ADMIN: CPT

## 2025-05-09 PROCEDURE — 3074F SYST BP LT 130 MM HG: CPT

## 2025-05-09 PROCEDURE — 90746 HEPB VACCINE 3 DOSE ADULT IM: CPT

## 2025-05-09 PROCEDURE — 92250 FUNDUS PHOTOGRAPHY W/I&R: CPT | Mod: 26

## 2025-05-09 PROCEDURE — 82570 ASSAY OF URINE CREATININE: CPT

## 2025-05-09 PROCEDURE — 82043 UR ALBUMIN QUANTITATIVE: CPT

## 2025-05-09 RX ORDER — GABAPENTIN 300 MG/1
300 CAPSULE ORAL
Qty: 30 CAPSULE | Refills: 11 | Status: SHIPPED | OUTPATIENT
Start: 2025-05-09

## 2025-05-09 RX ORDER — TRETINOIN 0.5 MG/G
CREAM TOPICAL
Qty: 45 G | Refills: 5 | Status: SHIPPED | OUTPATIENT
Start: 2025-05-09

## 2025-05-09 RX ORDER — ATORVASTATIN CALCIUM 10 MG/1
10 TABLET, FILM COATED ORAL NIGHTLY
Qty: 100 TABLET | Refills: 3 | Status: SHIPPED | OUTPATIENT
Start: 2025-05-09

## 2025-05-09 RX ORDER — ERGOCALCIFEROL 1.25 MG/1
50000 CAPSULE ORAL
Qty: 12 CAPSULE | Refills: 0 | Status: SHIPPED | OUTPATIENT
Start: 2025-05-09

## 2025-05-09 RX ORDER — METFORMIN HYDROCHLORIDE 500 MG/1
500 TABLET, EXTENDED RELEASE ORAL DAILY
Qty: 100 TABLET | Refills: 3 | Status: SHIPPED | OUTPATIENT
Start: 2025-05-09

## 2025-05-09 ASSESSMENT — FIBROSIS 4 INDEX: FIB4 SCORE: 0.65

## 2025-05-09 NOTE — ASSESSMENT & PLAN NOTE
Chronic, unstable. Will provide ergocalciferol 38253 iu once weekly for 12 weeks, followed by once daily vit d3 otc

## 2025-05-09 NOTE — ASSESSMENT & PLAN NOTE
Chronic, stable. Will hold metformin due to normal A1c. Continue semaglutide 2 mg weekly, consider reducing dose to 1 mg weekly.    Monofilament testing with a 10 gram force: sensation intact: intact bilaterally  Visual Inspection: Feet without maceration, ulcers, fissures.  Pedal pulses: intact bilaterally    Retinal screening   Uacr today

## 2025-05-09 NOTE — ASSESSMENT & PLAN NOTE
Taking gabapentin 300 mg nightly for this, using premarin. Unsure if symptom improvement.  Wearing lose clothing, avoiding triggers, seeing gyn for this, discussed recommendation for cervical cancer screening, reports sensitivity to lubricant.

## 2025-05-09 NOTE — PROGRESS NOTES
Verbal consent was acquired by the patient to use [employer] ambient listening note generation during this visit     Subjective:     CC: Diagnoses of Type 2 diabetes mellitus without complication, without long-term current use of insulin (HCC), Dyslipidemia, Seborrheic dermatitis of scalp, Vitamin D deficiency, Acne vulgaris, Vulvodynia, Need for vaccination, Diabetes mellitus type 2, noninsulin dependent (HCC), and Type 2 diabetes mellitus with hyperglycemia, without long-term current use of insulin (HCC) were pertinent to this visit.    HPI:   Sherry presents today with    History of Present Illness  The patient presents for evaluation of vitamin D deficiency, diabetes mellitus, migraines, weight loss, and health maintenance.    Vitamin D Deficiency  - Advised by psychiatrist to supplement diet with vitamins, including vitamin D3 with K2.    Diabetes Mellitus  - Currently on Ozempic 2 mg and metformin.    Migraines  - Experiences monthly migraines, possibly linked to menstrual cycle despite not menstruating.  - Considering discontinuing propranolol due to uncertain efficacy.  - Manages migraines with nortriptyline and naratriptan.  - Experiences nausea during episodes.    Weight Loss  - Attributes significant weight loss to Ozempic 2 mg regimen.  - Maintains a high-protein diet.  - Ensures adequate hydration.  - Reports occasional constipation and diarrhea.    Health Maintenance  - Plans to consult an ophthalmologist.  - Due for Pap smear.  - Recalls adverse reaction to lubricant during vulvodynia exam.  - Has not started gabapentin for vulvodynia, prescribed 300 mg 3 times at night but taking 2 doses, plans to increase dosage.  - Avoids triggers for vulvodynia, no irritation from toilet paper.  - On Premarin cream 5 mg 3 times a week, prescribed by urogynecologist.  - On atorvastatin for cholesterol management, questioning necessity due to weight loss.  - On Wellbutrin and Celebrex for back pain but not  taking it.  - Needs refill on clobetasol foam, plans to see dermatologist.  - Uses lactic acid daily for hyperpigmentation and chicken skin.  - Using Trelegy Ellipta inhaler, sees allergy specialist annually.  - On hydroxyzine, no longer uses ipratropium.  - On Aciphex.  - Takes spironolactone for PCOS.  - Needs dermatologist for Retin-A, does not need Kenalog cream.  - On Slynd for birth control, no refill needed.    Supplemental information: None.    Current Outpatient Medications   Medication Sig Dispense Refill    ergocalciferol (DRISDOL) 13035 UNIT capsule Take 1 Capsule by mouth every 7 days. 12 Capsule 0    atorvastatin (LIPITOR) 10 MG Tab Take 1 Tablet by mouth every evening. 100 Tablet 3    gabapentin (NEURONTIN) 300 MG Cap Take 1 Capsule by mouth at bedtime. Take 1 capsule at bedtime/ After 3 days if no drowsiness can increase to taking this two to three times daily. If does cause drowsiness, can increase to 300mg at bedtime. 30 Capsule 11    tretinoin (RETIN-A) 0.05 % cream Apply  topically every evening. 45 g 5    metFORMIN ER (GLUCOPHAGE XR) 500 MG TABLET SR 24 HR Take 1 Tablet by mouth every day. 100 Tablet 3    OZEMPIC, 2 MG/DOSE, 8 MG/3ML Solution Pen-injector INJECT 2MG UNDER THE SKIN EVERY 7 DAYS. 9 mL 0    Drospirenone (SLYND) 4 MG Tab Take 1 tablet by mouth once daily 84 Tablet 4    celecoxib (CELEBREX) 200 MG Cap Take 1 Capsule by mouth 2 times a day. 180 Capsule 1    methocarbamol (ROBAXIN) 750 MG Tab Take 1 Tablet by mouth 4 times a day. 180 Tablet 3    rabeprazole (ACIPHEX) 20 MG tablet Take 1 Tablet by mouth every day. 90 Tablet 3    naratriptan (AMERGE) 2.5 MG tablet Take 1 Tablet by mouth one time as needed for Migraine for up to 1 dose. 20 Tablet 11    PREMARIN 0.625 MG/GM Cream INSERT 0.5 GRAMS VAGINALLY TWICE A WEEK 30 g 6    ARIPiprazole (ABILIFY) 10 MG Tab Take 10 mg by mouth every day.      spironolactone (ALDACTONE) 25 MG Tab Take 1 Tablet by mouth 2 times a day. 180 Tablet 3     "fluticasone-umeclidinium-vilanterol (TRELEGY ELLIPTA) 200-62.5-25 mcg/act inhaler Inhale 1 Puff.      OXcarbazepine (TRILEPTAL) 300 MG Tab Take 300-600 mg by mouth 2 times a day. Taking 300mg in the AM and 600mg at night      azelastine (ASTELIN) 137 MCG/SPRAY nasal spray USE 1 SPRAY(S) IN EACH NOSTRIL TWICE DAILY, USE WITH FLUNISOLIDE NASLA SPRAY      Clobetasol Propionate Emulsion 0.05 % Foam AAA daily for 2 weeks then use as needed 100 g 1    buPROPion (WELLBUTRIN XL) 300 MG XL tablet       hydrOXYzine HCl (ATARAX) 25 MG Tab       hydrocortisone 2.5 % Ointment Apply 1 Application topically 2 times a day. 30 g 2    QUEtiapine (SEROQUEL) 25 MG Tab TAKE 4 TABLETS BY MOUTH AT BEDTIME 120 Tablet 0    albuterol 108 (90 Base) MCG/ACT Aero Soln inhalation aerosol Inhale 2 Puffs every 6 hours as needed for Shortness of Breath.      flunisolide (NASALIDE) 25 MCG/ACT (0.025%) Solution Administer 2 Sprays into affected nostril(S) 2 times a day. 1 Each 5     No current facility-administered medications for this visit.       Problem   Vulvodynia   Vitamin D Deficiency   Type 2 Diabetes Mellitus Without Complication, Without Long-Term Current Use of Insulin (Hcc)    This is a chronic condition.  Current medications:  Insulin:   Biguanide: metformin er 500 mg daily  GLP1-RA: semaglutide to 2 mg weekly  SGLT-2i:      DPP4-I:   TZD:   Donny:  Sulfonyluria:     Last A1c: 2/15/25 5.1%;  5/3/24 5.7%; 6/18/23 6.8%  Last Microalb/Cr ratio: 5/9/25  Fasting sugars:  Last diabetic foot exam: 5/9/25  Last retinal eye exam: 5/9/25  ACEi/ARB? N/a  Statin? Atorvastatin 10 mg daily  Aspirin? N/a  Concomitant HTN?   Nightly foot checks? recommended           ROS:  Review of Systems   All other systems reviewed and are negative.      Objective:     Exam:  /62 (BP Location: Left arm, Patient Position: Sitting, BP Cuff Size: Adult)   Pulse 88   Temp 36.5 °C (97.7 °F) (Temporal)   Resp 16   Ht 1.6 m (5' 3\")   Wt 56.2 kg (124 lb)   SpO2 " 98%   BMI 21.97 kg/m²  Body mass index is 21.97 kg/m².    Physical Exam  Vitals reviewed.   Constitutional:       General: She is not in acute distress.     Appearance: Normal appearance. She is not ill-appearing.   HENT:      Head: Normocephalic and atraumatic.   Cardiovascular:      Rate and Rhythm: Normal rate.      Pulses: Normal pulses.   Pulmonary:      Effort: Pulmonary effort is normal. No respiratory distress.   Skin:     General: Skin is warm and dry.      Findings: No rash.   Neurological:      General: No focal deficit present.      Mental Status: She is alert and oriented to person, place, and time.   Psychiatric:         Mood and Affect: Mood normal.         Behavior: Behavior normal.         Labs:    Latest Reference Range & Units 02/15/25 11:02   WBC 4.8 - 10.8 K/uL 9.5   RBC 4.20 - 5.40 M/uL 4.22   Hemoglobin 12.0 - 16.0 g/dL 12.6   Hematocrit 37.0 - 47.0 % 38.5   MCV 81.4 - 97.8 fL 91.2   MCH 27.0 - 33.0 pg 29.9   MCHC 32.2 - 35.5 g/dL 32.7   RDW 35.9 - 50.0 fL 41.5   Platelet Count 164 - 446 K/uL 375   MPV 9.0 - 12.9 fL 9.6   Sodium 135 - 145 mmol/L 140   Potassium 3.6 - 5.5 mmol/L 4.2   Chloride 96 - 112 mmol/L 107   Co2 20 - 33 mmol/L 22   Anion Gap 7.0 - 16.0  11.0   Glucose 65 - 99 mg/dL 84   Bun 8 - 22 mg/dL 10   Creatinine 0.50 - 1.40 mg/dL 0.59   GFR (CKD-EPI) >60 mL/min/1.73 m 2 113   Calcium 8.5 - 10.5 mg/dL 9.6   Correct Calcium 8.5 - 10.5 mg/dL 9.3   AST(SGOT) 12 - 45 U/L 22   ALT(SGPT) 2 - 50 U/L 16   Alkaline Phosphatase 30 - 99 U/L 56   Total Bilirubin 0.1 - 1.5 mg/dL 0.2   Albumin 3.2 - 4.9 g/dL 4.4   Total Protein 6.0 - 8.2 g/dL 7.1   Globulin 1.9 - 3.5 g/dL 2.7   A-G Ratio g/dL 1.6   Glycohemoglobin 4.0 - 5.6 % 5.1   Estim. Avg Glu mg/dL 100   Cholesterol,Tot 100 - 199 mg/dL 107   Triglycerides 0 - 149 mg/dL 64   HDL >=40 mg/dL 46   LDL <100 mg/dL 48   25-Hydroxy   Vitamin D 25 30 - 100 ng/mL 13 (L)   TSH 0.350 - 5.500 uIU/mL 0.810   Hepatitis C Antibody Non-Reactive   Non-Reactive   (L): Data is abnormally low    Assessment & Plan:     44 y.o. female with the following -     Assessment & Plan  1. Vitamin D deficiency: Vitamin D levels significantly low.  - Prescribe vitamin D2 once weekly for 3 months, then vitamin D3 with K2.    2. Diabetes mellitus: Blood glucose 5.1, good control.  - Discontinue metformin.  - Conduct urine test for proteinuria, eye and foot exams, recheck A1c in 3 months.    3. Migraines: Monthly migraines, possibly menstrual cycle-related.  - Discontinue propranolol.  - Consider alternative treatments if migraines increase.  - Nortriptyline effective.    4. Weight loss: Significant weight loss, 80 pounds in 2 years.  - May reduce Ozempic dosage.  - Advise daily protein intake of 20-60 grams.    5. Health maintenance: Due for Pap smear and mammogram.  - Administer third hepatitis B vaccine dose today.    6. Cholesterol management: Improved cholesterol levels.  - Reduce atorvastatin to 10 mg.    Follow-up  - Recheck A1c in 3 months.    Problem List Items Addressed This Visit          Family Medicine Problems    Vulvodynia    Taking gabapentin 300 mg nightly for this, using premarin. Unsure if symptom improvement.  Wearing lose clothing, avoiding triggers, seeing gyn for this, discussed recommendation for cervical cancer screening, reports sensitivity to lubricant.            Relevant Medications    gabapentin (NEURONTIN) 300 MG Cap    Vitamin D deficiency    Chronic, unstable. Will provide ergocalciferol 56252 iu once weekly for 12 weeks, followed by once daily vit d3 otc          Relevant Medications    ergocalciferol (DRISDOL) 52996 UNIT capsule       Other    Type 2 diabetes mellitus without complication, without long-term current use of insulin (HCC)    Chronic, stable. Will hold metformin due to normal A1c. Continue semaglutide 2 mg weekly, consider reducing dose to 1 mg weekly.    Monofilament testing with a 10 gram force: sensation intact: intact  bilaterally  Visual Inspection: Feet without maceration, ulcers, fissures.  Pedal pulses: intact bilaterally    Retinal screening   Uacr today             Relevant Medications    metFORMIN ER (GLUCOPHAGE XR) 500 MG TABLET SR 24 HR    Other Relevant Orders    POCT Retinal Eye Exam    Microalbumin Creat Ratio Urine - Clinic Collect    Diabetic Monofilament Lower Extremity Exam (Completed)    Dyslipidemia    Relevant Medications    atorvastatin (LIPITOR) 10 MG Tab     Other Visit Diagnoses         Seborrheic dermatitis of scalp        Relevant Orders    Referral to Dermatology      Acne vulgaris        Relevant Medications    tretinoin (RETIN-A) 0.05 % cream      Need for vaccination        Relevant Orders    Hepatitis B Vaccine Adult 20+ (Completed)      Diabetes mellitus type 2, noninsulin dependent (HCC)        Relevant Medications    metFORMIN ER (GLUCOPHAGE XR) 500 MG TABLET SR 24 HR      Type 2 diabetes mellitus with hyperglycemia, without long-term current use of insulin (HCC)        Relevant Medications    metFORMIN ER (GLUCOPHAGE XR) 500 MG TABLET SR 24 HR          Patient was educated in proper administration of medication(s) ordered today including safety, possible SE, risks, benefits, rationale and alternatives to therapy.   Supportive care, differential diagnoses, and indications for immediate follow-up discussed with patient.    Pathogenesis of diagnosis discussed including typical length and natural progression.    Instructed to return to clinic or nearest emergency department for any change in condition, further concerns, or worsening of symptoms.  Patient states understanding of the plan of care and discharge instructions.    Return in about 3 months (around 8/9/2025) for A1C, PAP.    Please note that this dictation was created using voice recognition software. I have made every reasonable attempt to correct obvious errors, but I expect that there are errors of grammar and possibly content that I did  not discover before finalizing the note.

## 2025-05-13 ENCOUNTER — RESULTS FOLLOW-UP (OUTPATIENT)
Dept: MEDICAL GROUP | Facility: PHYSICIAN GROUP | Age: 45
End: 2025-05-13

## 2025-05-15 ENCOUNTER — RESULTS FOLLOW-UP (OUTPATIENT)
Dept: MEDICAL GROUP | Facility: PHYSICIAN GROUP | Age: 45
End: 2025-05-15

## 2025-05-15 LAB — RETINAL SCREEN: NEGATIVE
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
## 2025-05-21 ENCOUNTER — GYNECOLOGY VISIT (OUTPATIENT)
Dept: GYNECOLOGY | Facility: CLINIC | Age: 45
End: 2025-05-21
Payer: COMMERCIAL

## 2025-05-21 VITALS
HEART RATE: 86 BPM | WEIGHT: 124 LBS | DIASTOLIC BLOOD PRESSURE: 68 MMHG | BODY MASS INDEX: 21.97 KG/M2 | SYSTOLIC BLOOD PRESSURE: 98 MMHG

## 2025-05-21 DIAGNOSIS — N94.819 VULVODYNIA: Primary | ICD-10-CM

## 2025-05-21 PROCEDURE — 3074F SYST BP LT 130 MM HG: CPT | Performed by: STUDENT IN AN ORGANIZED HEALTH CARE EDUCATION/TRAINING PROGRAM

## 2025-05-21 PROCEDURE — 99213 OFFICE O/P EST LOW 20 MIN: CPT | Performed by: STUDENT IN AN ORGANIZED HEALTH CARE EDUCATION/TRAINING PROGRAM

## 2025-05-21 PROCEDURE — 3078F DIAST BP <80 MM HG: CPT | Performed by: STUDENT IN AN ORGANIZED HEALTH CARE EDUCATION/TRAINING PROGRAM

## 2025-05-21 RX ORDER — LAMOTRIGINE 25 MG/1
25 TABLET ORAL DAILY
COMMUNITY
Start: 2025-05-08

## 2025-05-21 ASSESSMENT — FIBROSIS 4 INDEX: FIB4 SCORE: 0.65

## 2025-05-21 NOTE — ASSESSMENT & PLAN NOTE
Overall symptoms are improving with vaginal estrogen and gabapentin. Continue both medications. Has PFPT in July with 4 sessions scheduled at [facility]. If too painful can consider vaginal valium prior to PFPT.   - FU in 3 months after PFPT to assess symptoms.

## 2025-05-26 DIAGNOSIS — E28.2 PCOS (POLYCYSTIC OVARIAN SYNDROME): ICD-10-CM

## 2025-05-27 RX ORDER — SPIRONOLACTONE 25 MG/1
25 TABLET ORAL 2 TIMES DAILY
Qty: 180 TABLET | Refills: 3 | Status: SHIPPED | OUTPATIENT
Start: 2025-05-27

## 2025-05-27 NOTE — TELEPHONE ENCOUNTER
Received request via: Pharmacy    Was the patient seen in the last year in this department? Yes    Does the patient have an active prescription (recently filled or refills available) for medication(s) requested? No    Pharmacy Name:   [facility] [address] [place] [address] - [address] [address]  Phone: [phone] Fax: [phone]          Does the patient have long term Plus and need 100-day supply? (This applies to ALL medications) Patient does not have SCP

## 2025-06-03 ENCOUNTER — OFFICE VISIT (OUTPATIENT)
Dept: DERMATOLOGY | Facility: IMAGING CENTER | Age: 45
End: 2025-06-03
Payer: COMMERCIAL

## 2025-06-03 DIAGNOSIS — L21.9 SEBORRHEIC DERMATITIS: ICD-10-CM

## 2025-06-03 DIAGNOSIS — L65.9 HAIR LOSS: ICD-10-CM

## 2025-06-03 PROCEDURE — 99213 OFFICE O/P EST LOW 20 MIN: CPT | Performed by: NURSE PRACTITIONER

## 2025-06-03 RX ORDER — KETOCONAZOLE 20 MG/G
CREAM TOPICAL
Qty: 60 G | Refills: 3 | Status: SHIPPED | OUTPATIENT
Start: 2025-06-03

## 2025-06-03 RX ORDER — KETOCONAZOLE 20 MG/ML
SHAMPOO, SUSPENSION TOPICAL
Qty: 120 ML | Refills: 3 | Status: SHIPPED | OUTPATIENT
Start: 2025-06-03

## 2025-06-03 RX ORDER — CLOBETASOL PROPIONATE 0.5 MG/ML
SOLUTION TOPICAL
Qty: 50 ML | Refills: 3 | Status: SHIPPED | OUTPATIENT
Start: 2025-06-03

## 2025-06-03 NOTE — PROGRESS NOTES
DERMATOLOGY NOTE  NEW VISIT       Chief complaint: rash/ scalp issues      Hx of psoriasis , Hx of eczema   Discoloration  scars on chest   HPI:dry flaky scalp on - off for several years   Aggravating factors:  weather , heat   Alleviating factors: ice packs   New creams/topicals: clobetasol foam   New medications (up to last 6 months): none  New travel: none   Other exposures: none   Treatments: none         History of skin cancer: No  History of precancers/actinic keratoses: No  History of biopsies:No  History of blistering/severe sunburns:No  Family history of skin cancer:Yes, Details: mother BCC   Family history of atypical moles:No      Allergies[1]     MEDICATIONS:  Medications relevant to specialty reviewed.     REVIEW OF SYSTEMS:   Positive for skin (see HPI)  Negative for fevers and chills       EXAM:  There were no vitals taken for this visit.  Constitutional: Well-developed, well-nourished, and in no distress.     A focused skin exam was performed including the affected areas of the scalp. Notable findings on exam today listed below and/or in assessment/plan.     Notable hair thinning throughout frontal scalp--with no evidence of rash, erythema, lesion, infection or irritation. Does not have localized patchy hair loss    IMPRESSION / PLAN:    1. Seborrheic dermatitis, presumed  This is per pt's description, no concerning finding seen on exam, reports rash does sometimes affect the eyebrows  Reports treated for PSO, discussed likely seborrheic dermatitis vs sebopsoriasis of the scalp  Has Rx's below, but needs refills  TCS as below for irritation or flare  Pt will follow up if no continued control    - ketoconazole (NIZORAL) 2 % shampoo; Use daily for 1-2 weeks until resolved, then can use 2-3 times per week for maintenance  Dispense: 120 mL; Refill: 3  - ketoconazole (NIZORAL) 2 % Cream; AAA twice a day for 1-2 weeks, then use 2-3 times per week  Dispense: 60 g; Refill: 3  - clobetasol (TEMOVATE) 0.05 %  external solution; AAA, scalp, twice a day as needed  Dispense: 50 mL; Refill: 3    2. Hair loss, unspecified cause--TE (favored) vs androgenic hair loss  Discussed course/nature of both  Discussed importance of good hair health  Advised use of OTC minoxidil, 1-2 times per day, can take up to 3 months to see changes/improvement    Patient verbalized understanding and agrees with plan regarding the above            Please note that this dictation was created using voice recognition software. I have made every reasonable attempt to correct obvious errors, but I expect that there are errors of grammar and possibly content that I did not discover before finalizing the note.      Return to clinic in: Return for PRN . and as needed for any new or changing skin lesions.             [1]   Allergies  Allergen Reactions    Morphine      itchy

## 2025-06-12 ENCOUNTER — OFFICE VISIT (OUTPATIENT)
Dept: URGENT CARE | Facility: PHYSICIAN GROUP | Age: 45
End: 2025-06-12
Payer: COMMERCIAL

## 2025-06-12 ENCOUNTER — APPOINTMENT (OUTPATIENT)
Dept: RADIOLOGY | Facility: IMAGING CENTER | Age: 45
End: 2025-06-12
Payer: COMMERCIAL

## 2025-06-12 VITALS
WEIGHT: 131 LBS | SYSTOLIC BLOOD PRESSURE: 104 MMHG | HEART RATE: 91 BPM | OXYGEN SATURATION: 95 % | RESPIRATION RATE: 14 BRPM | DIASTOLIC BLOOD PRESSURE: 58 MMHG | HEIGHT: 63 IN | BODY MASS INDEX: 23.21 KG/M2 | TEMPERATURE: 97.1 F

## 2025-06-12 DIAGNOSIS — G89.29 CHRONIC LEFT-SIDED LOW BACK PAIN WITH LEFT-SIDED SCIATICA: Primary | ICD-10-CM

## 2025-06-12 DIAGNOSIS — M54.42 CHRONIC LEFT-SIDED LOW BACK PAIN WITH LEFT-SIDED SCIATICA: Primary | ICD-10-CM

## 2025-06-12 DIAGNOSIS — G89.29 ACUTE ON CHRONIC BACK PAIN: ICD-10-CM

## 2025-06-12 DIAGNOSIS — M54.16 LUMBAR BACK PAIN WITH RADICULOPATHY AFFECTING LEFT LOWER EXTREMITY: ICD-10-CM

## 2025-06-12 DIAGNOSIS — M54.9 ACUTE ON CHRONIC BACK PAIN: ICD-10-CM

## 2025-06-12 PROCEDURE — 3074F SYST BP LT 130 MM HG: CPT

## 2025-06-12 PROCEDURE — 99214 OFFICE O/P EST MOD 30 MIN: CPT

## 2025-06-12 PROCEDURE — 3078F DIAST BP <80 MM HG: CPT

## 2025-06-12 PROCEDURE — 72100 X-RAY EXAM L-S SPINE 2/3 VWS: CPT | Mod: TC | Performed by: RADIOLOGY

## 2025-06-12 ASSESSMENT — ENCOUNTER SYMPTOMS
SHORTNESS OF BREATH: 0
TINGLING: 0
VOMITING: 0
DIARRHEA: 0
NECK PAIN: 0
HIP PAIN: 1
CONSTIPATION: 0
MYALGIAS: 0
HEADACHES: 0
NAUSEA: 0
SORE THROAT: 0
COUGH: 0
ABDOMINAL PAIN: 0
TINGLING: 1
PALPITATIONS: 0
FEVER: 0
CHILLS: 0
ORTHOPNEA: 0
BACK PAIN: 1
FOCAL WEAKNESS: 0
FALLS: 0
WEAKNESS: 0
LOSS OF CONSCIOUSNESS: 0
FLANK PAIN: 0
DIZZINESS: 0
WEIGHT LOSS: 0

## 2025-06-12 ASSESSMENT — FIBROSIS 4 INDEX: FIB4 SCORE: 0.65

## 2025-06-12 NOTE — PROGRESS NOTES
"  Subjective:   CHIEF COMPLAINT  Chief Complaint   Patient presents with    Hip Pain     Left side, px radiates downward to food, a few weeks          Sherry Hatfield is a very pleasant 44 y.o. female who presents for Hip Pain (Left side, px radiates downward to food, a few weeks )      Pt presents with c/o LLE numbness that has been present for the last approximately 3 weeks. Pt states sxs seemingly came out of nowhere. She denies any trauma, falls, or injuries to the affected area. She does have a significant history of LBP and sciatica. She has been seen for similar sxs and currently is taking celecoxib, gabapentin, and methocarbomal for her sxs which she states has not been improving numbness. She denies any overt pain, states it \"is just numb.\" No cauda equina sxs, loss of bowel and bladder, and no other focal neurological sxs.    Hip Pain  Pertinent negatives include no headaches, myalgias, neck pain or weakness.       Review of Systems   Gastrointestinal:  Negative for constipation and diarrhea.   Genitourinary:  Negative for dysuria, frequency and urgency.   Musculoskeletal:  Positive for back pain (hip soreness/numbness). Negative for falls, joint pain, myalgias and neck pain.   Neurological:  Negative for tingling, focal weakness, loss of consciousness, weakness and headaches.   All other systems reviewed and are negative.    Refer to HPI for additional details.    During this visit, appropriate PPE was worn, and hand hygiene was performed.    PMH:  has a past medical history of Apnea, sleep, Asthma, Bipolar 1 disorder (HCC), Daytime sleepiness, Diabetes (HCC), Gasping for breath, GERD (gastroesophageal reflux disease), Insomnia, Migraine headache without aura, Morning headache, PCOS (polycystic ovarian syndrome), and Snoring.    MEDS: Current Medications[1]    ALLERGIES: Allergies[2]  SURGHX: Past Surgical History[3]  SOCHX:  reports that she has never smoked. She has never used smokeless tobacco. " "She reports that she does not drink alcohol and does not use drugs.    FH: Per HPI as applicable/pertinent.    Medications, Allergies, and current problem list reviewed today in Epic.     Objective:     /58 (BP Location: Left arm, Patient Position: Sitting, BP Cuff Size: Adult)   Pulse 91   Temp 36.2 °C (97.1 °F) (Temporal)   Resp 14   Ht 1.6 m (5' 3\")   Wt 59.4 kg (131 lb)   SpO2 95%     Physical Exam  Vitals reviewed.   Constitutional:       General: She is not in acute distress.     Appearance: She is normal weight. She is not ill-appearing.   HENT:      Head: Normocephalic and atraumatic.   Cardiovascular:      Rate and Rhythm: Normal rate.      Pulses: Normal pulses.   Pulmonary:      Effort: Pulmonary effort is normal.   Musculoskeletal:      Cervical back: Normal and normal range of motion. No rigidity or tenderness. Normal range of motion.      Thoracic back: No tenderness or bony tenderness. Normal range of motion.      Lumbar back: Tenderness present. No swelling, edema, deformity, signs of trauma, lacerations, spasms or bony tenderness. Decreased range of motion. Negative right straight leg raise test and negative left straight leg raise test. No scoliosis.        Back:    Neurological:      Mental Status: She is alert.      Sensory: Sensory deficit (LLE) present.      Deep Tendon Reflexes:      Reflex Scores:       Patellar reflexes are 1+ on the right side and 1+ on the left side.        Assessment/Plan:     Diagnosis and associated orders:     1. Chronic left-sided low back pain with left-sided sciatica  - DX-LUMBAR SPINE-2 OR 3 VIEWS  - Referral to Pain Clinic    2. Acute on chronic back pain  - DX-LUMBAR SPINE-2 OR 3 VIEWS  - Referral to Pain Clinic    3. Lumbar back pain with radiculopathy affecting left lower extremity  - Referral to Pain Clinic     Comments/MDM:     Patient history and physical exam consistent with acute on chronic LBP with radiculopathy. No red flag sxs of back pain " nor distress noted  I discussed HPI and physical exam with patient. Overall PE is benign, no worrisome sxs or sxs of cauda equina. Did discuss that sxs may be result of prolonged sitting and decreased exercise. Low suspicion for DVT at this time, no swelling, erythema, or pain. Recommended doing in clinic xray to r/o osseous injury  Lumbar xray no acute osseous abnormality  Outpatient management will consist of continue with previously prescribed celocoxib, methocarbomal, and gabapentin. F/u with physiatry/pain management. Monitor symptoms  Follow up in 3-5 days if no improvement in symptoms         Differential diagnosis, natural history, supportive care, and indications for immediate follow-up discussed.    Advised the patient to follow-up with the primary care physician for recheck, reevaluation, and consideration of further management.    Please note that this dictation was created using voice recognition software. I have made a reasonable attempt to correct obvious errors, but I expect that there are errors of grammar and possibly content that I did not discover before finalizing the note.    This note was electronically signed by JASPER Odonnell         [1]   Current Outpatient Medications:     ketoconazole (NIZORAL) 2 % shampoo, Use daily for 1-2 weeks until resolved, then can use 2-3 times per week for maintenance, Disp: 120 mL, Rfl: 3    ketoconazole (NIZORAL) 2 % Cream, AAA twice a day for 1-2 weeks, then use 2-3 times per week, Disp: 60 g, Rfl: 3    clobetasol (TEMOVATE) 0.05 % external solution, AAA, scalp, twice a day as needed, Disp: 50 mL, Rfl: 3    spironolactone (ALDACTONE) 25 MG Tab, Take 1 tablet by mouth twice daily, Disp: 180 Tablet, Rfl: 3    lamoTRIgine (LAMICTAL) 25 MG Tab, Take 25 mg by mouth every day. AM, Disp: , Rfl:     ergocalciferol (DRISDOL) 21297 UNIT capsule, Take 1 Capsule by mouth every 7 days., Disp: 12 Capsule, Rfl: 0    atorvastatin (LIPITOR) 10 MG Tab, Take 1  Tablet by mouth every evening., Disp: 100 Tablet, Rfl: 3    gabapentin (NEURONTIN) 300 MG Cap, Take 1 Capsule by mouth at bedtime. Take 1 capsule at bedtime/ After 3 days if no drowsiness can increase to taking this two to three times daily. If does cause drowsiness, can increase to 300mg at bedtime., Disp: 30 Capsule, Rfl: 11    tretinoin (RETIN-A) 0.05 % cream, Apply  topically every evening., Disp: 45 g, Rfl: 5    metFORMIN ER (GLUCOPHAGE XR) 500 MG TABLET SR 24 HR, Take 1 Tablet by mouth every day., Disp: 100 Tablet, Rfl: 3    OZEMPIC, 2 MG/DOSE, 8 MG/3ML Solution Pen-injector, INJECT 2MG UNDER THE SKIN EVERY 7 DAYS., Disp: 9 mL, Rfl: 0    Drospirenone (SLYND) 4 MG Tab, Take 1 tablet by mouth once daily, Disp: 84 Tablet, Rfl: 4    celecoxib (CELEBREX) 200 MG Cap, Take 1 Capsule by mouth 2 times a day., Disp: 180 Capsule, Rfl: 1    methocarbamol (ROBAXIN) 750 MG Tab, Take 1 Tablet by mouth 4 times a day., Disp: 180 Tablet, Rfl: 3    rabeprazole (ACIPHEX) 20 MG tablet, Take 1 Tablet by mouth every day., Disp: 90 Tablet, Rfl: 3    naratriptan (AMERGE) 2.5 MG tablet, Take 1 Tablet by mouth one time as needed for Migraine for up to 1 dose., Disp: 20 Tablet, Rfl: 11    PREMARIN 0.625 MG/GM Cream, INSERT 0.5 GRAMS VAGINALLY TWICE A WEEK, Disp: 30 g, Rfl: 6    ARIPiprazole (ABILIFY) 10 MG Tab, Take 10 mg by mouth every day., Disp: , Rfl:     fluticasone-umeclidinium-vilanterol (TRELEGY ELLIPTA) 200-62.5-25 mcg/act inhaler, Inhale 1 Puff., Disp: , Rfl:     OXcarbazepine (TRILEPTAL) 300 MG Tab, Take 300-600 mg by mouth 2 times a day. Taking 300mg in the AM and 600mg at night, Disp: , Rfl:     azelastine (ASTELIN) 137 MCG/SPRAY nasal spray, USE 1 SPRAY(S) IN EACH NOSTRIL TWICE DAILY, USE WITH FLUNISOLIDE NASLA SPRAY, Disp: , Rfl:     Clobetasol Propionate Emulsion 0.05 % Foam, AAA daily for 2 weeks then use as needed, Disp: 100 g, Rfl: 1    buPROPion (WELLBUTRIN XL) 300 MG XL tablet, , Disp: , Rfl:     hydrOXYzine HCl  (ATARAX) 25 MG Tab, , Disp: , Rfl:     hydrocortisone 2.5 % Ointment, Apply 1 Application topically 2 times a day., Disp: 30 g, Rfl: 2    QUEtiapine (SEROQUEL) 25 MG Tab, TAKE 4 TABLETS BY MOUTH AT BEDTIME, Disp: 120 Tablet, Rfl: 0    albuterol 108 (90 Base) MCG/ACT Aero Soln inhalation aerosol, Inhale 2 Puffs every 6 hours as needed for Shortness of Breath., Disp: , Rfl:     flunisolide (NASALIDE) 25 MCG/ACT (0.025%) Solution, Administer 2 Sprays into affected nostril(S) 2 times a day., Disp: 1 Each, Rfl: 5  [2]   Allergies  Allergen Reactions    Morphine      itchy   [3]   Past Surgical History:  Procedure Laterality Date    PRIMARY C SECTION      x2    TUBAL COAGULATION LAPAROSCOPIC BILATERAL

## 2025-06-12 NOTE — PROGRESS NOTES
"Subjective     Sherry Hatfield is a 44 y.o. female who presents with Hip Pain (Left side, px radiates downward to food, a few weeks )            Patient with a significant history of lower back pain and sciatica, treated with gabapentin, celecoxib, methocarbamol who presents with new onset left leg numbness that begins from the back of her hip and spans to her left middle toe. The numbness is not associated with any pain and denies lifting anything heavy and or recent trauma, falls, or injuries. She does report sitting for long hours at work.  Additionally, she denies extremity weakness, saddle anesthesia, change or loss of bowel or urine, difficulty walking, edema, redness, or calf pain.     Hip Pain  Pertinent negatives include no abdominal pain, chest pain, chills, congestion, coughing, fever, headaches, nausea, rash, sore throat or vomiting.       Review of Systems   Constitutional:  Negative for chills, fever, malaise/fatigue and weight loss.   HENT:  Negative for congestion and sore throat.    Respiratory:  Negative for cough and shortness of breath.    Cardiovascular:  Negative for chest pain, palpitations, orthopnea and leg swelling.   Gastrointestinal:  Negative for abdominal pain, constipation, diarrhea, nausea and vomiting.   Genitourinary:  Negative for dysuria, flank pain, frequency, hematuria and urgency.   Musculoskeletal:  Positive for back pain (Left hips discomfot). Negative for falls and joint pain.   Skin:  Negative for itching and rash.   Neurological:  Positive for tingling (numbnes to LLE). Negative for dizziness, focal weakness and headaches.              Objective     /58 (BP Location: Left arm, Patient Position: Sitting, BP Cuff Size: Adult)   Pulse 91   Temp 36.2 °C (97.1 °F) (Temporal)   Resp 14   Ht 1.6 m (5' 3\")   Wt 59.4 kg (131 lb)   SpO2 95%   BMI 23.21 kg/m²      Physical Exam  Constitutional:       Appearance: Normal appearance.   HENT:      Head: Normocephalic. "   Eyes:      Extraocular Movements: Extraocular movements intact.   Cardiovascular:      Rate and Rhythm: Normal rate and regular rhythm.   Pulmonary:      Effort: Pulmonary effort is normal.      Breath sounds: Normal breath sounds.   Abdominal:      General: Abdomen is flat.      Palpations: Abdomen is soft.   Musculoskeletal:         General: Normal range of motion.      Cervical back: Normal range of motion and neck supple.   Neurological:      General: No focal deficit present.      Mental Status: She is alert and oriented to person, place, and time.   Psychiatric:         Mood and Affect: Mood normal.                                  Assessment & Plan

## 2025-06-25 NOTE — TELEPHONE ENCOUNTER
Received request via: Pharmacy    Was the patient seen in the last year in this department? Yes    Does the patient have an active prescription (recently filled or refills available) for medication(s) requested? No   never used

## 2025-07-02 ENCOUNTER — APPOINTMENT (OUTPATIENT)
Dept: PHYSICAL MEDICINE AND REHAB | Facility: MEDICAL CENTER | Age: 45
End: 2025-07-02
Payer: COMMERCIAL

## 2025-07-02 VITALS
DIASTOLIC BLOOD PRESSURE: 63 MMHG | TEMPERATURE: 97.1 F | WEIGHT: 125 LBS | HEIGHT: 63 IN | OXYGEN SATURATION: 99 % | HEART RATE: 87 BPM | SYSTOLIC BLOOD PRESSURE: 97 MMHG | BODY MASS INDEX: 22.15 KG/M2

## 2025-07-02 DIAGNOSIS — R20.0 LEFT LEG NUMBNESS: Primary | ICD-10-CM

## 2025-07-02 PROCEDURE — 3078F DIAST BP <80 MM HG: CPT | Performed by: PHYSICAL MEDICINE & REHABILITATION

## 2025-07-02 PROCEDURE — 99204 OFFICE O/P NEW MOD 45 MIN: CPT | Performed by: PHYSICAL MEDICINE & REHABILITATION

## 2025-07-02 PROCEDURE — 3074F SYST BP LT 130 MM HG: CPT | Performed by: PHYSICAL MEDICINE & REHABILITATION

## 2025-07-02 PROCEDURE — 1126F AMNT PAIN NOTED NONE PRSNT: CPT | Performed by: PHYSICAL MEDICINE & REHABILITATION

## 2025-07-02 ASSESSMENT — FIBROSIS 4 INDEX: FIB4 SCORE: .66

## 2025-07-02 ASSESSMENT — PATIENT HEALTH QUESTIONNAIRE - PHQ9
SUM OF ALL RESPONSES TO PHQ QUESTIONS 1-9: 6
5. POOR APPETITE OR OVEREATING: 0 - NOT AT ALL
CLINICAL INTERPRETATION OF PHQ2 SCORE: 2

## 2025-07-02 ASSESSMENT — PAIN SCALES - GENERAL: PAINLEVEL_OUTOF10: NO PAIN

## 2025-07-02 NOTE — PROGRESS NOTES
[facility] (Physical Medicine and Rehabilitation)  Interventional Pain and Spine   New Patient Visit      Date of service: See epic    Chief complaint:   Chief Complaint   Patient presents with    New Patient     Back pain        Referring provider: Julieth Russell A*     HISTORY    HPI: Sherry Hatfield 45 y.o.  who presents today with The encounter diagnosis was Left leg numbness.    HPI    Verbal consent was obtained for Stew copilot: Yes      History of Present Illness  The patient, a 45-year-old female, presents for an initial consultation. She reports chronic paresthesia in the left lower extremity, specifically along the posterior and posterolateral aspect. Initially, this region was painful, but she now experiences only numbness.    Chronic Paresthesia in Left Lower Extremity  The patient describes persistent paresthesia in the left leg, which has been present for approximately 6 weeks. The sensation is characterized as a tickling feeling extending from above the knee to the dorsum of the foot, originating from the posterior thigh. She denies any associated pain or muscle weakness. Her current medication regimen includes gabapentin administered at night, which induces somnolence.  - Onset: Approximately 6 weeks ago.  - Location: Left lower extremity, specifically along the posterior and posterolateral aspect.  - Duration: Persistent for approximately 6 weeks.  - Character: Tickling feeling extending from above the knee to the dorsum of the foot, originating from the posterior thigh.  - Alleviating Factors: Gabapentin administered at night.  - Severity: Initially painful, now only numbness.    MEDICATIONS  - Gabapentin              Medical records review:  I reviewed the note from the referring provider Julieth Russell A* .           ROS:   Red Flags ROS:   Fever, Chills, Sweats: Denies  Involuntary Weight Loss: Denies  Bladder Incontinence: Denies  Bowel Incontinence: denies  Saddle  "Anesthesia: Denies    All other systems reviewed and negative.       PMHx:   Past Medical History[1]      Medications Ordered Prior to Encounter[2]     PSHx:   Past Surgical History[3]    Family history   Family History   Problem Relation Age of Onset    GI Disease Mother         liver cirrhosis s/p transplant due to ETOH    Diabetes Mother     Cancer Mother         basal cell cancer    Alcohol abuse Mother     Heart Disease Father         CHF    GI Disease Father         early liver cirrhosis    Alcohol abuse Father     Diabetes Father     Kidney Disease Father         ESRD on dialysis    Obesity Sister     Diabetes Sister     Alcohol abuse Sister     No Known Problems Brother          Medications: reviewed on epic.   Active Medications[4]     Allergies:   Allergies[5]    Social Hx:   Social History     Socioeconomic History    Marital status:      Spouse name: Not on file    Number of children: 5    Years of education: Not on file    Highest education level: Not on file   Occupational History    Occupation:      Comment: [employer]   Tobacco Use    Smoking status: Never    Smokeless tobacco: Never   Vaping Use    Vaping status: Never Used   Substance and Sexual Activity    Alcohol use: Never    Drug use: Never    Sexual activity: Yes     Partners: Male     Birth control/protection: Pill   Other Topics Concern    Not on file   Social History Narrative    Not on file     Social Drivers of Health     Financial Resource Strain: Not on file   Food Insecurity: Not on file   Transportation Needs: Not on file   Physical Activity: Not on file   Stress: Not on file   Social Connections: Not on file   Intimate Partner Violence: Not on file   Housing Stability: Not on file         EXAMINATION     Physical Exam:   Vitals: BP 97/63 (BP Location: Right arm, Patient Position: Sitting, BP Cuff Size: Adult)   Pulse 87   Temp 36.2 °C (97.1 °F) (Temporal)   Ht 1.6 m (5' 3\")   Wt 56.7 kg (125 " lb)   SpO2 99%     Constitutional:   Body Habitus: Body mass index is 22.14 kg/m².  Cooperation: Fully cooperates with exam  Appearance: Well-groomed, well-nourished, not disheveled     Eyes: No scleral icterus to suggest severe liver disease, no proptosis to suggest severe hyperthyroid    ENT -no obvious auditory deficits, no obvious tongue lesions, tongue midline, no facial droop     Skin -no rashes or lesions noted     Respiratory-  breathing comfortable on room air, no audible wheezing    Cardiovascular- capillary refills less than 2 seconds.     Psychiatric- alert and oriented ×3. Normal affect.     Gait - normal gait, no use of ambulatory device, nonantalgic. .     Musculoskeletal and Neuro -     Physical Exam  Musculoskeletal exam was performed.         Thoracic/Lumbar Spine/Sacral Spine/Hips   There are no signs of infection around the injection sites.   full  active range of motion with flexion, lateral flexion, and rotation bilaterally.   There is full  active range of motion with lumbar extension.    There is no  pain with lumbar extension.   There is no pain with facet loading maneuver (extension rotation) with axial low back pain on the BILATERAL side(s)       Palpation:   No tenderness to palpation in midline at T1-T12 levels. No tenderness to palpation in the left and right of the midline T1-L5, NEGATIVE for tenderness to palpation to the para-midline region in the lower lumbar levels.  palpation over SI joint: negative bilaterally    palpation in hip or over the gluteus medius tendon insertion: negative bilaterally      Lumbar spine Special tests  Neuro tension  Straight leg test negative bilaterally    Slump test negative bilaterally      Key points for the international standards for neurological classification of spinal cord injury (ISNCSCI) to light touch.     Dermatome R L                                      L2 2 2   L3 2 2   L4 2 2   L5 2 2   S1 2 2   S2 2 2         Motor Exam Lower  "Extremities    ? Myotome R L   Hip flexion L2 5 5   Knee extension L3 5 5   Ankle dorsiflexion L4 5 5   Toe extension L5 5 5   Ankle plantarflexion S1 5 5         MEDICAL DECISION MAKING    Medical records review: see under HPI section.     DATA    Labs:   Lab Results   Component Value Date/Time    SODIUM 140 02/15/2025 11:02 AM    POTASSIUM 4.2 02/15/2025 11:02 AM    CHLORIDE 107 02/15/2025 11:02 AM    CO2 22 02/15/2025 11:02 AM    ANION 11.0 02/15/2025 11:02 AM    GLUCOSE 84 02/15/2025 11:02 AM    BUN 10 02/15/2025 11:02 AM    CREATININE 0.59 02/15/2025 11:02 AM    CALCIUM 9.6 02/15/2025 11:02 AM    ASTSGOT 22 02/15/2025 11:02 AM    ALTSGPT 16 02/15/2025 11:02 AM    TBILIRUBIN 0.2 02/15/2025 11:02 AM    ALBUMIN 4.4 02/15/2025 11:02 AM    TOTPROTEIN 7.1 02/15/2025 11:02 AM    GLOBULIN 2.7 02/15/2025 11:02 AM    AGRATIO 1.6 02/15/2025 11:02 AM   ]    No results found for: \"PROTHROMBTM\", \"INR\"     Lab Results   Component Value Date/Time    WBC 9.5 02/15/2025 11:02 AM    RBC 4.22 02/15/2025 11:02 AM    HEMOGLOBIN 12.6 02/15/2025 11:02 AM    HEMATOCRIT 38.5 02/15/2025 11:02 AM    MCV 91.2 02/15/2025 11:02 AM    MCH 29.9 02/15/2025 11:02 AM    MCHC 32.7 02/15/2025 11:02 AM    MPV 9.6 02/15/2025 11:02 AM    NEUTSPOLYS 49.20 10/20/2023 10:04 AM    LYMPHOCYTES 39.80 10/20/2023 10:04 AM    MONOCYTES 6.10 10/20/2023 10:04 AM    EOSINOPHILS 3.10 10/20/2023 10:04 AM    BASOPHILS 0.60 10/20/2023 10:04 AM        Lab Results   Component Value Date/Time    HBA1C 5.1 02/15/2025 11:02 AM        Imaging:   I personally reviewed following images, these are my reads      Results  - Imaging:    - X-ray of lumbar spine (06/12/2025):      - No areas of high grade degenerative changes      - No significant alignment abnormalities      - No fractures         IMAGING radiology reads. I reviewed the following radiology reads                                                               Results for orders placed during the hospital encounter " of 07/23/23    DX-ANKLE 2- VIEWS LEFT    Impression  No evidence of acute fracture or dislocation.    Results for orders placed during the hospital encounter of 07/23/23    DX-CERVICAL SPINE-2 OR 3 VIEWS    Impression  Normal cervical spine.     Results for orders placed in visit on 02/16/23    DX-CHEST-2 VIEWS    Impression  No acute cardiopulmonary abnormality.                      Results for orders placed in visit on 06/12/25    DX-LUMBAR SPINE-2 OR 3 VIEWS    Impression  1.  Mild degenerative change of lumbar spine.  2.  No fracture or subluxation.               Results for orders placed during the hospital encounter of 07/23/23    DX-THORACIC SPINE-2 VIEWS    Impression  Unremarkable thoracic spine.            Diagnosis   Visit Diagnoses     ICD-10-CM   1. Left leg numbness  R20.0           ASSESSMENT AND PLAN:  Sherry Hatfield 45 y.o. female      Sherry was seen today for new patient.    Diagnoses and all orders for this visit:    Left leg numbness  -     EMG/NCS; Future            Assessment & Plan  Chronic numbness and tingling down the left leg. The symptoms may be due to nerve impingement, possibly originating from the lumbar spine or one of the nerves in the leg.  However the patient has no back pain and there were no signs of injury.  - An EMG test will be ordered to further investigate the cause of the symptoms.  - She is currently on gabapentin, which she takes at night. She is advised to continue this regimen and consider taking it during the day if it does not cause excessive sleepiness.  - If the EMG results indicate a specific issue, further treatment options will be discussed.  - Continue gabapentin      Follow up: Continue to follow-up in clinic with the physician who performed the EMG      Please note that this dictation was created using voice recognition software. I have made every reasonable attempt to correct obvious errors but there may be errors of grammar and content that I may  have overlooked prior to finalization of this note.      Jean Carlson MD  Physical Medicine and Rehabilitation  Interventional Spine and Sports Physiatry  [facility]         CC JASPER Schwartz   CC Julieth Russell A*          [1]   Past Medical History:  Diagnosis Date    Apnea, sleep     Asthma     Bipolar 1 disorder (HCC)     Daytime sleepiness     Diabetes (HCC)     Gasping for breath     GERD (gastroesophageal reflux disease)     Insomnia     Migraine headache without aura     Morning headache     PCOS (polycystic ovarian syndrome)     Snoring    [2]   Current Outpatient Medications on File Prior to Visit   Medication Sig Dispense Refill    ketoconazole (NIZORAL) 2 % shampoo Use daily for 1-2 weeks until resolved, then can use 2-3 times per week for maintenance 120 mL 3    ketoconazole (NIZORAL) 2 % Cream AAA twice a day for 1-2 weeks, then use 2-3 times per week 60 g 3    clobetasol (TEMOVATE) 0.05 % external solution AAA, scalp, twice a day as needed 50 mL 3    spironolactone (ALDACTONE) 25 MG Tab Take 1 tablet by mouth twice daily 180 Tablet 3    lamoTRIgine (LAMICTAL) 25 MG Tab Take 25 mg by mouth every day. AM      ergocalciferol (DRISDOL) 74023 UNIT capsule Take 1 Capsule by mouth every 7 days. 12 Capsule 0    atorvastatin (LIPITOR) 10 MG Tab Take 1 Tablet by mouth every evening. 100 Tablet 3    gabapentin (NEURONTIN) 300 MG Cap Take 1 Capsule by mouth at bedtime. Take 1 capsule at bedtime/ After 3 days if no drowsiness can increase to taking this two to three times daily. If does cause drowsiness, can increase to 300mg at bedtime. 30 Capsule 11    tretinoin (RETIN-A) 0.05 % cream Apply  topically every evening. 45 g 5    metFORMIN ER (GLUCOPHAGE XR) 500 MG TABLET SR 24 HR Take 1 Tablet by mouth every day. 100 Tablet 3    OZEMPIC, 2 MG/DOSE, 8 MG/3ML Solution Pen-injector INJECT 2MG UNDER THE SKIN EVERY 7 DAYS. 9 mL 0    Drospirenone (SLYND) 4 MG Tab Take 1 tablet by mouth  once daily 84 Tablet 4    celecoxib (CELEBREX) 200 MG Cap Take 1 Capsule by mouth 2 times a day. 180 Capsule 1    methocarbamol (ROBAXIN) 750 MG Tab Take 1 Tablet by mouth 4 times a day. 180 Tablet 3    rabeprazole (ACIPHEX) 20 MG tablet Take 1 Tablet by mouth every day. 90 Tablet 3    naratriptan (AMERGE) 2.5 MG tablet Take 1 Tablet by mouth one time as needed for Migraine for up to 1 dose. 20 Tablet 11    PREMARIN 0.625 MG/GM Cream INSERT 0.5 GRAMS VAGINALLY TWICE A WEEK 30 g 6    ARIPiprazole (ABILIFY) 10 MG Tab Take 10 mg by mouth every day.      fluticasone-umeclidinium-vilanterol (TRELEGY ELLIPTA) 200-62.5-25 mcg/act inhaler Inhale 1 Puff.      OXcarbazepine (TRILEPTAL) 300 MG Tab Take 300-600 mg by mouth 2 times a day. Taking 300mg in the AM and 600mg at night      azelastine (ASTELIN) 137 MCG/SPRAY nasal spray USE 1 SPRAY(S) IN EACH NOSTRIL TWICE DAILY, USE WITH FLUNISOLIDE NASLA SPRAY      Clobetasol Propionate Emulsion 0.05 % Foam AAA daily for 2 weeks then use as needed 100 g 1    buPROPion (WELLBUTRIN XL) 300 MG XL tablet       hydrOXYzine HCl (ATARAX) 25 MG Tab       hydrocortisone 2.5 % Ointment Apply 1 Application topically 2 times a day. 30 g 2    QUEtiapine (SEROQUEL) 25 MG Tab TAKE 4 TABLETS BY MOUTH AT BEDTIME 120 Tablet 0    albuterol 108 (90 Base) MCG/ACT Aero Soln inhalation aerosol Inhale 2 Puffs every 6 hours as needed for Shortness of Breath.      flunisolide (NASALIDE) 25 MCG/ACT (0.025%) Solution Administer 2 Sprays into affected nostril(S) 2 times a day. 1 Each 5     No current facility-administered medications on file prior to visit.   [3]   Past Surgical History:  Procedure Laterality Date    PRIMARY C SECTION      x2    TUBAL COAGULATION LAPAROSCOPIC BILATERAL     [4]   Outpatient Medications Marked as Taking for the 7/2/25 encounter (Office Visit) with Jean Carlson M.D.   Medication Sig Dispense Refill    ketoconazole (NIZORAL) 2 % shampoo Use daily for 1-2 weeks until resolved,  then can use 2-3 times per week for maintenance 120 mL 3    ketoconazole (NIZORAL) 2 % Cream AAA twice a day for 1-2 weeks, then use 2-3 times per week 60 g 3    clobetasol (TEMOVATE) 0.05 % external solution AAA, scalp, twice a day as needed 50 mL 3    spironolactone (ALDACTONE) 25 MG Tab Take 1 tablet by mouth twice daily 180 Tablet 3    lamoTRIgine (LAMICTAL) 25 MG Tab Take 25 mg by mouth every day. AM      ergocalciferol (DRISDOL) 74012 UNIT capsule Take 1 Capsule by mouth every 7 days. 12 Capsule 0    atorvastatin (LIPITOR) 10 MG Tab Take 1 Tablet by mouth every evening. 100 Tablet 3    gabapentin (NEURONTIN) 300 MG Cap Take 1 Capsule by mouth at bedtime. Take 1 capsule at bedtime/ After 3 days if no drowsiness can increase to taking this two to three times daily. If does cause drowsiness, can increase to 300mg at bedtime. 30 Capsule 11    tretinoin (RETIN-A) 0.05 % cream Apply  topically every evening. 45 g 5    metFORMIN ER (GLUCOPHAGE XR) 500 MG TABLET SR 24 HR Take 1 Tablet by mouth every day. 100 Tablet 3    OZEMPIC, 2 MG/DOSE, 8 MG/3ML Solution Pen-injector INJECT 2MG UNDER THE SKIN EVERY 7 DAYS. 9 mL 0    Drospirenone (SLYND) 4 MG Tab Take 1 tablet by mouth once daily 84 Tablet 4    celecoxib (CELEBREX) 200 MG Cap Take 1 Capsule by mouth 2 times a day. 180 Capsule 1    methocarbamol (ROBAXIN) 750 MG Tab Take 1 Tablet by mouth 4 times a day. 180 Tablet 3    rabeprazole (ACIPHEX) 20 MG tablet Take 1 Tablet by mouth every day. 90 Tablet 3    naratriptan (AMERGE) 2.5 MG tablet Take 1 Tablet by mouth one time as needed for Migraine for up to 1 dose. 20 Tablet 11    PREMARIN 0.625 MG/GM Cream INSERT 0.5 GRAMS VAGINALLY TWICE A WEEK 30 g 6    ARIPiprazole (ABILIFY) 10 MG Tab Take 10 mg by mouth every day.      fluticasone-umeclidinium-vilanterol (TRELEGY ELLIPTA) 200-62.5-25 mcg/act inhaler Inhale 1 Puff.      OXcarbazepine (TRILEPTAL) 300 MG Tab Take 300-600 mg by mouth 2 times a day. Taking 300mg in the AM  and 600mg at night      azelastine (ASTELIN) 137 MCG/SPRAY nasal spray USE 1 SPRAY(S) IN EACH NOSTRIL TWICE DAILY, USE WITH FLUNISOLIDE NASLA SPRAY      Clobetasol Propionate Emulsion 0.05 % Foam AAA daily for 2 weeks then use as needed 100 g 1    buPROPion (WELLBUTRIN XL) 300 MG XL tablet       hydrOXYzine HCl (ATARAX) 25 MG Tab       hydrocortisone 2.5 % Ointment Apply 1 Application topically 2 times a day. 30 g 2    QUEtiapine (SEROQUEL) 25 MG Tab TAKE 4 TABLETS BY MOUTH AT BEDTIME 120 Tablet 0    albuterol 108 (90 Base) MCG/ACT Aero Soln inhalation aerosol Inhale 2 Puffs every 6 hours as needed for Shortness of Breath.      flunisolide (NASALIDE) 25 MCG/ACT (0.025%) Solution Administer 2 Sprays into affected nostril(S) 2 times a day. 1 Each 5   [5]   Allergies  Allergen Reactions    Morphine      itchy

## 2025-07-10 ENCOUNTER — APPOINTMENT (OUTPATIENT)
Dept: PHYSICAL MEDICINE AND REHAB | Facility: MEDICAL CENTER | Age: 45
End: 2025-07-10
Payer: COMMERCIAL

## 2025-08-04 DIAGNOSIS — E11.65 TYPE 2 DIABETES MELLITUS WITH HYPERGLYCEMIA, WITHOUT LONG-TERM CURRENT USE OF INSULIN (HCC): ICD-10-CM

## 2025-08-04 DIAGNOSIS — N94.10 DYSPAREUNIA, FEMALE: ICD-10-CM

## 2025-08-04 DIAGNOSIS — E78.5 DYSLIPIDEMIA: ICD-10-CM

## 2025-08-04 DIAGNOSIS — M54.42 ACUTE LEFT-SIDED LOW BACK PAIN WITH LEFT-SIDED SCIATICA: ICD-10-CM

## 2025-08-04 DIAGNOSIS — N89.8 VAGINAL DRYNESS: ICD-10-CM

## 2025-08-04 RX ORDER — CELECOXIB 200 MG/1
200 CAPSULE ORAL 2 TIMES DAILY
Qty: 180 CAPSULE | Refills: 1 | Status: SHIPPED | OUTPATIENT
Start: 2025-08-04

## 2025-08-04 RX ORDER — SEMAGLUTIDE 2.68 MG/ML
2 INJECTION, SOLUTION SUBCUTANEOUS
Qty: 9 ML | Refills: 3 | Status: SHIPPED | OUTPATIENT
Start: 2025-08-04

## 2025-08-04 RX ORDER — CELECOXIB 200 MG/1
200 CAPSULE ORAL 2 TIMES DAILY
Qty: 180 CAPSULE | Refills: 0 | OUTPATIENT
Start: 2025-08-04

## 2025-08-04 RX ORDER — CONJUGATED ESTROGENS 0.62 MG/G
CREAM VAGINAL
Qty: 30 G | Refills: 0 | Status: SHIPPED | OUTPATIENT
Start: 2025-08-04

## 2025-08-25 ENCOUNTER — APPOINTMENT (OUTPATIENT)
Dept: MEDICAL GROUP | Facility: PHYSICIAN GROUP | Age: 45
End: 2025-08-25
Payer: COMMERCIAL

## 2025-08-26 ENCOUNTER — APPOINTMENT (OUTPATIENT)
Dept: GYNECOLOGY | Facility: CLINIC | Age: 45
End: 2025-08-26
Payer: COMMERCIAL

## 2025-09-16 ENCOUNTER — APPOINTMENT (OUTPATIENT)
Dept: GYNECOLOGY | Facility: CLINIC | Age: 45
End: 2025-09-16
Payer: COMMERCIAL

## 2025-11-14 ENCOUNTER — APPOINTMENT (OUTPATIENT)
Dept: MEDICAL GROUP | Facility: PHYSICIAN GROUP | Age: 45
End: 2025-11-14
Payer: COMMERCIAL